# Patient Record
Sex: FEMALE | Race: WHITE | Employment: UNEMPLOYED | URBAN - METROPOLITAN AREA
[De-identification: names, ages, dates, MRNs, and addresses within clinical notes are randomized per-mention and may not be internally consistent; named-entity substitution may affect disease eponyms.]

---

## 2019-06-12 ENCOUNTER — TELEPHONE (OUTPATIENT)
Dept: ORTHOPEDIC SURGERY | Age: 24
End: 2019-06-12

## 2020-03-30 ENCOUNTER — HOSPITAL ENCOUNTER (OUTPATIENT)
Dept: PHYSICAL THERAPY | Age: 25
Setting detail: THERAPIES SERIES
Discharge: HOME | End: 2020-03-30
Payer: COMMERCIAL

## 2020-03-30 PROCEDURE — 97110 THERAPEUTIC EXERCISES: CPT

## 2020-03-30 PROCEDURE — 97161 PT EVAL LOW COMPLEX 20 MIN: CPT

## 2020-03-30 NOTE — FLOWSHEET NOTE
assisted shoulder flexion Hold 10 sec, 10 reps [x]        []        []        []        []        []          []         []        []        []        []        []        []        []        []      Therapeutic Exercise/Home Exercise Program:   40 minutes  Pt inst in role of PT, prognosis, plan of care, use of CP, activity modification, and benefits of therapy. PROM to R UE x 20 minutes   HEP has been established, see above, pt given handouts of exercises and provided with access code to [address] to do HEP 3x/day for now. Access Code: [facility]   URL: [email or web address]   Date: 03/30/2020   Prepared by: Aramis Grant     Exercises   · Seated Scapular Retraction - 10 reps - 2 sets - 3 hold - 3x daily - 7x weekly   · Seated Elbow Flexion and Extension AROM - 10 reps - 3x daily - 7x weekly   · Supine Shoulder Flexion AAROM - 10 reps - 3x daily - 7x weekly       Group Therapy:    0 minutes    Therapeutic Activity:  0 minutes     Gait: 0 minutes    Neuromuscular Re-Education:  0 minutes      Canalith Repositioning Procedure:  0 minutes    Manual Therapy:  0 minutes    Modalities: 0 minutes    Functional Outcome Measure:   []NA  Measure Used: SPADI  Date Assessed: 3/30/20  Score: 124/130    Assessment/Treatment/Activity Tolerance:    Patients response to treatment:   [x]Patient tolerated treatment well []Patient limited by fatigue   []Patient limited by pain  []Patient limited by other medical complications   []Other:     Goals:   Progress towards goals:  Goals established on 3/30/20     GOALS    Short Term Goals:   6  weeks Long Term Goals:   12  weeks   1). Establish HEP 1). Pt independent with HEP   2). Pain  5/10 or less 2). Pain  2/10 or less   3). PROM WFL R shoulder and elbow 3). AROM WFL R shoulder and elbow   4). 4). Strength 4+/5 or greater R shoulder    5). 5). No significant scapular winging and good scapulo-thoracic rhythm.     6). 6).  SPADI score improved Prognosis: [x]Good   []Fair   []Poor    Patient Requires Follow-up:  [x]Yes  []No    Plan: [x]Plan of care initiated     []Continue per plan of care    [] Alter current plan (see comments)    []Hold pending MD visit []Discharge    Timed Code Treatment Minutes:  40    Total Treatment Minutes:  65    Medicare Cap total YTD:   [x]N/A    Workers Comp Time Stamp  [x]N/A   Time In:   Time Out:    Electronically signed by:  Pranay Araujo, OMT-C, [id number]

## 2020-04-01 ENCOUNTER — HOSPITAL ENCOUNTER (OUTPATIENT)
Dept: PHYSICAL THERAPY | Age: 25
Setting detail: THERAPIES SERIES
Discharge: HOME | End: 2020-04-01
Payer: COMMERCIAL

## 2020-04-01 PROCEDURE — 97140 MANUAL THERAPY 1/> REGIONS: CPT

## 2020-04-01 PROCEDURE — 97110 THERAPEUTIC EXERCISES: CPT

## 2020-04-01 PROCEDURE — 97016 VASOPNEUMATIC DEVICE THERAPY: CPT

## 2020-04-01 NOTE — FLOWSHEET NOTE
comments       []      Seated elbow extension/flexion  Reviewed, Hold 10 sec into extension, 10 reps [x]      Seated scap retraction with arm in sling Reviewed, Hold 3 sec, 2x10 [x]      Supine assisted shoulder flexion Reviewed, Hold 10 sec, 10 reps [x]      Wrist and hand ROM, gripping exs with ball  Pt inst to do throughout day [x]        []        []        []        []          []         []        []        []        []        []        []        []        []      Therapeutic Exercise/Home Exercise Program:   40 minutes  PT notes swelling R UE   PROM to R UE x 20 minutes   PROM: elbow extension 0 deg, IR to 45 deg in plane of scapula, ER 0 deg in plane of scapula, flexion 90 deg, abduction 70 deg  HEP has been established, see above, pt given handouts of exercises and provided with access code to [address] to do HEP 3x/day for now. Access Code: [facility]   URL: [email or web address]   Date: 03/30/2020   Prepared by: Tegan Torres     Exercises   · Seated Scapular Retraction - 10 reps - 2 sets - 3 hold - 3x daily - 7x weekly   · Seated Elbow Flexion and Extension AROM - 10 reps - 3x daily - 7x weekly   · Supine Shoulder Flexion AAROM - 10 reps - 3x daily - 7x weekly       Group Therapy:    0 minutes    Therapeutic Activity:  0 minutes     Gait: 0 minutes    Neuromuscular Re-Education:  0 minutes      Canalith Repositioning Procedure:  0 minutes    Manual Therapy:  10 minutes  Scapular mobilization in SL, gentle grade 1-2    Modalities: 15 minutes  vasopneumatic device x 15 minutes, 40 degrees with pillowcase barrier, low compression x 15 minutes in supine with arm propped, for pain and swelling.      Functional Outcome Measure:   []NA  Measure Used: SPADI  Date Assessed: 3/30/20  Score: 124/130    Assessment/Treatment/Activity Tolerance:    Patients response to treatment:   [x]Patient tolerated treatment well []Patient limited by fatigue   []Patient limited by pain  []Patient limited by other medical complications   []Other:     Goals:   Progress towards goals:  Goals established on 3/30/20     GOALS    Short Term Goals:   6  weeks Long Term Goals:   12  weeks   1). Establish HEP 1). Pt independent with HEP   2). Pain  5/10 or less 2). Pain  2/10 or less   3). PROM WFL R shoulder and elbow 3). AROM WFL R shoulder and elbow   4). 4). Strength 4+/5 or greater R shoulder    5). 5). No significant scapular winging and good scapulo-thoracic rhythm.     6). 6).  SPADI score improved          Prognosis: [x]Good   []Fair   []Poor    Patient Requires Follow-up:  [x]Yes  []No    Plan: []Plan of care initiated     [x]Continue per plan of care    [] Alter current plan (see comments)    []Hold pending MD visit []Discharge    Timed Code Treatment Minutes:  50    Total Treatment Minutes:  65    Medicare Cap total YTD:   [x]N/A    Workers Comp Time Stamp  [x]N/A   Time In:   Time Out:    Electronically signed by:  Crispin Shahid PT, OMT-C, [id number]

## 2020-04-06 ENCOUNTER — HOSPITAL ENCOUNTER (OUTPATIENT)
Dept: PHYSICAL THERAPY | Age: 25
Setting detail: THERAPIES SERIES
Discharge: HOME | End: 2020-04-06
Payer: COMMERCIAL

## 2020-04-06 PROCEDURE — 97110 THERAPEUTIC EXERCISES: CPT

## 2020-04-06 NOTE — FLOWSHEET NOTE
Internal Rotation AAROM with Dowel - 10 reps - 2 sets - 2x daily - 7x weekly   Supine Shoulder Flexion Extension Full Range AROM - 5-10 reps - 2x daily - 7x weekly   Sidelying Shoulder Abduction Palm Forward - 5-10 reps - 2x daily - 7x weekly       Group Therapy:    0 minutes    Therapeutic Activity:  0 minutes     Gait: 0 minutes    Neuromuscular Re-Education:  0 minutes      Canalith Repositioning Procedure:  0 minutes    Manual Therapy:  0 minutes      Modalities: 5 minutes  vCP in supine to R shoulder x 5 minutes    Functional Outcome Measure:   []NA  Measure Used: SPADI  Date Assessed: 3/30/20  Score: 124/130    Assessment/Treatment/Activity Tolerance:    Patients response to treatment:   [x]Patient tolerated treatment well []Patient limited by fatigue   []Patient limited by pain  []Patient limited by other medical complications   []Other:     Goals:   Progress towards goals:  Goals established on 3/30/20     GOALS    Short Term Goals:   6  weeks Long Term Goals:   12  weeks   1). Establish HEP 1). Pt independent with HEP   2). Pain  5/10 or less 2). Pain  2/10 or less   3). PROM WFL R shoulder and elbow 3). AROM WFL R shoulder and elbow   4). 4). Strength 4+/5 or greater R shoulder    5). 5). No significant scapular winging and good scapulo-thoracic rhythm.     6). 6).  SPADI score improved          Prognosis: [x]Good   []Fair   []Poor    Patient Requires Follow-up:  [x]Yes  []No    Plan: []Plan of care initiated     [x]Continue per plan of care    [] Alter current plan (see comments)    []Hold pending MD visit []Discharge    Timed Code Treatment Minutes:  45    Total Treatment Minutes:  45 + CP     Medicare Cap total YTD:   [x]N/A    Workers Comp Time Stamp  [x]N/A   Time In:   Time Out:    Electronically signed by:  Pranay Mclaughlin, OMT-C, [id number]

## 2020-04-08 ENCOUNTER — HOSPITAL ENCOUNTER (OUTPATIENT)
Dept: PHYSICAL THERAPY | Age: 25
Setting detail: THERAPIES SERIES
Discharge: HOME | End: 2020-04-08
Payer: COMMERCIAL

## 2020-04-13 ENCOUNTER — HOSPITAL ENCOUNTER (OUTPATIENT)
Dept: PHYSICAL THERAPY | Age: 25
Setting detail: THERAPIES SERIES
Discharge: HOME | End: 2020-04-13
Payer: COMMERCIAL

## 2020-04-13 PROCEDURE — 97110 THERAPEUTIC EXERCISES: CPT

## 2020-04-13 PROCEDURE — 97140 MANUAL THERAPY 1/> REGIONS: CPT

## 2020-04-13 NOTE — FLOWSHEET NOTE
and Extension AROM - 10 reps - 2x daily - 7x weekly   Supine Shoulder Flexion AAROM - 10 reps - 2 sets - 2x daily - 7x weekly   Supine Shoulder External Internal Rotation AAROM with Dowel - 10 reps - 2 sets - 2x daily - 7x weekly   Supine Shoulder Flexion Extension Full Range AROM - 5-10 reps - 2x daily - 7x weekly   Sidelying Shoulder Abduction Palm Forward - 5-10 reps - 2x daily - 7x weekly   standing shoulder flexion - 10 reps - 2x daily - 7x weekly     Group Therapy:    0 minutes    Therapeutic Activity:  0 minutes     Gait: 0 minutes    Neuromuscular Re-Education:  0 minutes      Canalith Repositioning Procedure:  0 minutes    Manual Therapy:  10 minutes  Scapular mobilization in SL    Modalities: 5 minutes  CP in supine to R shoulder x 5 minutes    Functional Outcome Measure:   []NA  Measure Used: SPADI  Date Assessed: 3/30/20  Score: 124/130    Assessment/Treatment/Activity Tolerance:  Pt improving  Patients response to treatment:   [x]Patient tolerated treatment well []Patient limited by fatigue   []Patient limited by pain  []Patient limited by other medical complications   []Other:     Goals:   Progress towards goals:  Goals established on 3/30/20     GOALS    Short Term Goals:   6  weeks Long Term Goals:   12  weeks   1). Establish HEP 1). Pt independent with HEP   2). Pain  5/10 or less 2). Pain  2/10 or less   3). PROM WFL R shoulder and elbow 3). AROM WFL R shoulder and elbow   4). 4). Strength 4+/5 or greater R shoulder    5). 5). No significant scapular winging and good scapulo-thoracic rhythm.     6). 6).  SPADI score improved          Prognosis: [x]Good   []Fair   []Poor    Patient Requires Follow-up:  [x]Yes  []No    Plan: []Plan of care initiated     [x]Continue per plan of care    [] Alter current plan (see comments)    []Hold pending MD visit []Discharge    Timed Code Treatment Minutes:  45    Total Treatment Minutes:  45 + CP     Medicare Cap total YTD:   [x]N/A    Workers Comp Time

## 2020-04-15 ENCOUNTER — APPOINTMENT (OUTPATIENT)
Dept: PHYSICAL THERAPY | Age: 25
End: 2020-04-15
Payer: COMMERCIAL

## 2020-04-20 ENCOUNTER — HOSPITAL ENCOUNTER (OUTPATIENT)
Dept: PHYSICAL THERAPY | Age: 25
Setting detail: THERAPIES SERIES
Discharge: HOME | End: 2020-04-20
Payer: COMMERCIAL

## 2020-04-20 PROCEDURE — 97110 THERAPEUTIC EXERCISES: CPT

## 2020-04-20 PROCEDURE — 97140 MANUAL THERAPY 1/> REGIONS: CPT

## 2020-04-20 NOTE — PROGRESS NOTES
Active abduction in standing with good scapulor-humeral rhythm  10 reps [x] Cues for good technique        []        []        []        []        []        []        []        []      Therapeutic Exercise/Home Exercise Program:   48 minutes  PROM (not stretching) to R UE x 20 minutes   PROM: IR 53 deg @ 45 deg abd, ER 12 deg @ 45 deg abd, flexion 130, abd 95, elbow ext/flex WNL  AROM: flexion 80, ext 30, abd 55, IR to L5  HEP has been established, see above, pt's [employer] HEP updated (pt declined handouts today stating braulio is working well)  Pt inst to do HEP 2x/day now. Access Code: [facility]   URL: [email or web address]   Date: 04/20/2020   Prepared by: Amarilis Kaur     Exercises   Seated Scapular Retraction - 10 reps - 2 sets - 3 hold - 2x daily - 7x weekly   Seated Elbow Flexion and Extension AROM - 10 reps - 2x daily - 7x weekly   Supine Shoulder Flexion AAROM - 10 reps - 2 sets - 2x daily - 7x weekly   Supine Shoulder External Internal Rotation AAROM with Dowel - 10 reps - 2 sets - 2x daily - 7x weekly   Supine Shoulder Flexion Extension Full Range AROM - 5-10 reps - 2x daily - 7x weekly   Sidelying Shoulder Abduction Palm Forward - 5-10 reps - 2x daily - 7x weekly   standing shoulder flexion - 10 reps - 2x daily - 7x weekly   Shoulder Abduction - Thumbs Up - 10 reps - 2x daily - 7x weekly     Group Therapy:    0 minutes    Therapeutic Activity:  0 minutes     Gait: 0 minutes    Neuromuscular Re-Education:  0 minutes      Canalith Repositioning Procedure:  0 minutes    Manual Therapy:  10 minutes  Scapular mobilization in SL    Modalities: 5 minutes  CP in supine to R shoulder x 5 minutes    Functional Outcome Measure:   []NA  Measure Used: SPADI  Date Assessed: 3/30/20  Score: 124/130    Assessment/Treatment/Activity Tolerance:  Pt making progress with PT. ROM improving and pain levels decreasing. Pt will benefit from continued therapy to address remaining goals and maximize function.    Patients

## 2020-04-27 ENCOUNTER — HOSPITAL ENCOUNTER (OUTPATIENT)
Dept: PHYSICAL THERAPY | Age: 25
Setting detail: THERAPIES SERIES
Discharge: HOME | End: 2020-04-27
Payer: COMMERCIAL

## 2020-04-27 PROCEDURE — 97112 NEUROMUSCULAR REEDUCATION: CPT

## 2020-04-27 PROCEDURE — 97110 THERAPEUTIC EXERCISES: CPT

## 2020-04-27 NOTE — FLOWSHEET NOTE
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 Nando Multani [facility]  Phone [phone], fax [phone]                                                     Visits Allowed/Insurance/Certification Information:  [employer] $85 copay, no precert needed, 90 visits per year     Restrictions/Precautions: New PT order stating AROM as tolerated, NO PREs, no PROM, ok to DC sling HOWEVER  [facility] (PASSIVE, ACTIVE, FLEXIBILITY AND STRETCHING)     Plan of care sent to provider:      [x]Faxed  []Co-signature    (attempts: 1[x] 2 [x] 4/20/20  3[])         Plan of care signed:      []Yes date:            [x]No      Progress Note covers period from (if applicable):    []NA    [x]From 3/30/20 To 4/20/20           Next Progress Note due:  By 5/20/20        Visit# / total visits:  6/24    Plan for Next Session:  PROM, AAROM, AROM, progress per restrictions, no PREs, modalities as needed. Addendum:  After therapy appt today, PT received fax from MD office but it was the same prescription that was dated on 4/2/20 with no new additions and states no PREs. PT will need to call MD office to clarify if pt is able to progress to strengthening. Subjective:   Pt states she had her video visit with PA last Thursday. Pt states he will be sending back instructions with progress note  Pt states PA states no stretching of any kind and ok to start some strengthening.   Pt to return to PA for in well []Patient limited by fatigue   []Patient limited by pain  []Patient limited by other medical complications   []Other:     Goals:   Progress towards goals:  Goals met as marked below per progress note on 4/20/20     GOALS    Short Term Goals:   6  weeks Long Term Goals:   12  weeks   1). Establish HEP MET 1). Pt independent with HEP   2). Pain  5/10 or less ALMOST MET 2). Pain  2/10 or less   3). PROM WFL R shoulder and elbow PARTIALLY MET 3). AROM WFL R shoulder and elbow   4). 4). Strength 4+/5 or greater R shoulder    5). 5). No significant scapular winging and good scapulo-thoracic rhythm.     6). 6).  SPADI score improved          Prognosis: [x]Good   []Fair   []Poor    Patient Requires Follow-up:  [x]Yes  []No    Plan: []Plan of care initiated     [x]Continue per plan of care    [] Alter current plan (see comments)    []Hold pending MD visit []Discharge    Timed Code Treatment Minutes:  56    Total Treatment Minutes:  56      Medicare Cap total YTD:   [x]N/A    Workers Comp Time Stamp  [x]N/A   Time In:   Time Out:    Electronically signed by:  Crispin Shahid PT, OMT-C, [id number]

## 2020-05-04 ENCOUNTER — HOSPITAL ENCOUNTER (OUTPATIENT)
Dept: PHYSICAL THERAPY | Age: 25
Setting detail: THERAPIES SERIES
Discharge: HOME | End: 2020-05-04
Payer: COMMERCIAL

## 2020-05-04 PROCEDURE — 97110 THERAPEUTIC EXERCISES: CPT

## 2020-05-04 PROCEDURE — 97112 NEUROMUSCULAR REEDUCATION: CPT

## 2020-05-06 ENCOUNTER — HOSPITAL ENCOUNTER (OUTPATIENT)
Dept: PHYSICAL THERAPY | Age: 25
Setting detail: THERAPIES SERIES
Discharge: HOME | End: 2020-05-06
Payer: COMMERCIAL

## 2020-05-06 PROCEDURE — 97112 NEUROMUSCULAR REEDUCATION: CPT

## 2020-05-06 PROCEDURE — 97110 THERAPEUTIC EXERCISES: CPT

## 2020-05-06 NOTE — FLOWSHEET NOTE
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 Bhardwaj Held [facility]  Phone [phone], fax [phone]                                                     Visits Allowed/Insurance/Certification Information:  [employer] $22 copay, no precert needed, 90 visits per year     Restrictions/Precautions: New PT order 4/30/20 stating continue PT, modalities as needed, AROM as tolerated, ok to do PREs, no PROM per Dr. Dao Guy; also received new order from [place], [place] on 4/27/20 stating same as above with addition of 65 degrees max of ER     Plan of care sent to provider:      [x]Faxed  []Co-signature    (attempts: 1[x] 2 [x] 4/20/20  3[x]  5/4/20)         Plan of care signed:      []Yes date:            [x]No      Progress Note covers period from (if applicable):    [x]NA    []From 4/20/20           Next Progress Note due:  By 5/20/20        Visit# / total visits:  8/24    Plan for Next Session:  AAROM, AROM, progress strength as tolerated, ok for PREs, modalities as needed. Subjective:   Pt states she continues to wake up with soreness in the morning, thinks it is because she is rolling onto R arm and arm is under her pillow. Pt returns to MD at end of this month for an in office visit. Pt needs to leave early today for a work meeting. When asked about using braulio, pt states yes, but \"I have them all pretty much memorized\"    .        Pain level:  2/10 currently at rest  Pain as high as 2/10 since last Serratus punch  1#, 3x10 [x]      Figure eights in 90 deg shoulder flexion supine  2# 2x10 [x]        []      Therapeutic Exercise/Home Exercise Program:   30 minutes  AAROM  to R UE x 10 minutes   AAROM in supine: IR 45 deg @ 70 deg abd, ER 20 deg @ 70 deg abd, flexion 130, abd 95 (pt complains of pain at top of shoulder 3-4/10)    HEP has been established, see above, pt's [facility] HEP updated (pt declines futher handouts stating she uses the braulio)  Pt inst to do HEP 1-2x/day, depending on type of ex  Pt inst in progression of reps. Access Code: [facility]   URL: [email or web address]   Date: 05/06/2020   Prepared by: Jose F Santos     Exercises   Seated Scapular Retraction - 10 reps - 2 sets - 3 hold - 1-2x daily - 7x weekly   Seated Elbow Flexion and Extension AROM - 10 reps - 1-2x daily - 7x weekly   Supine Shoulder Flexion AAROM - 10 reps - 2 sets - 1-2x daily - 7x weekly   Supine Shoulder External Internal Rotation AAROM with Dowel - 10 reps - 2 sets - 1-2x daily - 7x weekly   Single Arm Shoulder Abduction AAROM - 10 reps - 2 sets - 1-2x daily - 7x weekly   Supine Shoulder Flexion Extension Full Range AROM - 10 reps - 2-3 sets - 1x daily - 7x weekly   Sidelying Shoulder Abduction Palm Forward - 10 reps - 3 sets - 1-2x daily - 7x weekly   standing shoulder flexion - 10 reps - 1-2x daily - 7x weekly   Shoulder Abduction - Thumbs Up - 10 reps - 1-2x daily - 7x weekly   Sidelying Shoulder External Rotation with Dumbbell - 10 reps - 2-3 sets - 1x daily - 7x weekly   Supine Single Arm Shoulder Protraction - 10 reps - 2-3 sets - 1x daily - 7x weekly   Supine Elbow Flexion Extension with Dumbbell - 10 reps - 2-3 sets - 1x daily - 7x weekly   Prone Shoulder Extension - Single Arm with Dumbbell - 10 reps - 2-3 sets - 1x daily - 7x weekly   Standing Shoulder Row with Anchored Resistance - 10-15 reps - 2-3 sets - 1x daily - 7x weekly     Group Therapy:    0 minutes    Therapeutic Activity:  0 minutes     Gait: 0

## 2020-05-11 ENCOUNTER — HOSPITAL ENCOUNTER (OUTPATIENT)
Dept: PHYSICAL THERAPY | Age: 25
Setting detail: THERAPIES SERIES
Discharge: HOME | End: 2020-05-11
Payer: COMMERCIAL

## 2020-05-11 PROCEDURE — 97112 NEUROMUSCULAR REEDUCATION: CPT

## 2020-05-11 PROCEDURE — 97110 THERAPEUTIC EXERCISES: CPT

## 2020-05-11 NOTE — FLOWSHEET NOTE
initiated     [x]Continue per plan of care    [] Alter current plan (see comments)    []Hold pending MD visit []Discharge    Timed Code Treatment Minutes:  60    Total Treatment Minutes:  60      Medicare Cap total YTD:   [x]N/A    Workers Comp Time Stamp  [x]N/A   Time In:   Time Out:    Electronically signed by:  Ryan Jimenez, [address] [facility], [facility], [id number]

## 2020-05-13 ENCOUNTER — HOSPITAL ENCOUNTER (OUTPATIENT)
Dept: PHYSICAL THERAPY | Age: 25
Setting detail: THERAPIES SERIES
Discharge: HOME | End: 2020-05-13
Payer: COMMERCIAL

## 2020-05-13 PROCEDURE — 97112 NEUROMUSCULAR REEDUCATION: CPT

## 2020-05-13 PROCEDURE — 97110 THERAPEUTIC EXERCISES: CPT

## 2020-05-13 NOTE — FLOWSHEET NOTE
Neuromuscular Re-education  []      Rhythmic stabilization  Mod pertubations, distal, 3x30 sec  []      Serratus punch  2#, 3x12 [x]      Figure eights in 90 deg flexion  2# 3x12 [x]      PNF diagonals supine  Active, 10 reps each in pain free range  Verbal and manual cues     Wall push ups (triceps push up)  3x10 [x] Pt inst to increase reps to 3x12-15 as able                       Therapeutic Exercise/Home Exercise Program:   38 minutes  PT notes pt has not logged into [employer] braulio since 4/27/20. Pt encouraged to continue with exs daily, pt has stated last visit that she has most of the exs memorized. AAROM  to R UE x 10 minutes   AAROM in supine: IR 35 deg @ 70 deg abd, ER 24 deg @ 70 deg abd, flexion 137, abd 90 (pt complains of pain at top of shoulder 3-4/10)    HEP has been established, see above, pt's [employer] HEP updated (pt declines futher handouts stating she uses the braulio)  Pt inst to do HEP 1-2x/day, depending on type of ex  Pt inst in progression of reps. Access Code: [facility]   URL: [email or web address]   Date: 05/13/2020   Prepared by: Senthil Cheek     Exercises   Seated Scapular Retraction - 10 reps - 2 sets - 3 hold - 1-2x daily - 7x weekly   Supine Shoulder Flexion AAROM - 10 reps - 2 sets - 1-2x daily - 7x weekly   Supine Shoulder External Internal Rotation AAROM with Dowel - 10 reps - 2 sets - 1-2x daily - 7x weekly   Single Arm Shoulder Abduction AAROM - 10 reps - 2 sets - 1-2x daily - 7x weekly   Supine Shoulder Flexion Extension Full Range AROM - 10 reps - 2-3 sets - 1x daily - 7x weekly   Sidelying Shoulder Abduction Palm Forward - 10 reps - 3 sets - 1-2x daily - 7x weekly   standing shoulder flexion - 10 reps - 1-2x daily - 7x weekly   Shoulder Abduction - Thumbs Up - 10 reps - 1-2x daily - 7x weekly   Sidelying Shoulder External Rotation with Dumbbell - 10 reps - 2-3 sets - 1x daily - 7x weekly   Supine Single Arm Shoulder Protraction - 10 reps - 2-3 sets - 1x daily - 7x comments)    []Hold pending MD visit []Discharge    Timed Code Treatment Minutes:  58    Total Treatment Minutes:  58 + CP       Medicare Cap total YTD:   [x]N/A    Workers Comp Time Stamp  [x]N/A   Time In:   Time Out:    Electronically signed by:  Pranay Ferraro, OMT-C, [id number]

## 2020-05-18 ENCOUNTER — HOSPITAL ENCOUNTER (OUTPATIENT)
Dept: PHYSICAL THERAPY | Age: 25
Setting detail: THERAPIES SERIES
Discharge: HOME | End: 2020-05-18
Payer: COMMERCIAL

## 2020-05-18 PROCEDURE — 97140 MANUAL THERAPY 1/> REGIONS: CPT

## 2020-05-18 PROCEDURE — 97110 THERAPEUTIC EXERCISES: CPT

## 2020-05-18 PROCEDURE — 97112 NEUROMUSCULAR REEDUCATION: CPT

## 2020-05-18 NOTE — PROGRESS NOTES
Tiffanie Walker PA-C / Dr. Uma Crain      Pt has had 11 treatments of PT since 3/30/20. Pt generally reports pain as high as 2/10. AAROM supine: IR 40 deg @ 70 deg abd, ER 24 deg @ 70 deg abd, flexion 140, abd 95, elbow ext/flex WNL  AROM standing: flexion 110 with min scap shrugging,  abd 90 deg with min scap shrugging, ext 38, abd 55, IR to L2, ER to back of head with compensation  Strength:  Extension 4+/5, add 5/5, IR 4/5, ER 4-/5, flexion 3-/5 (unable to lift full range against gravity), abd 3-/5 (unable to lift full range against gravity)  Pt has been tolerating PREs but has been having difficulty gaining ROM especially ER. I understand pt is allowed to progress ER up to 65 degrees, but we are holding steady at 20-24 degrees. Pt also have difficulty with elevation (flexion and abduction) due to head of humerus migrating superiorly. May we do gentle joint mobilizations for inferior capsule and posterior capsule so that head of humerus can descend to allow comfortable elevation? May we do more PROM to ER with the knowledge that we are not to overly stretch anterior capsule but only to progress ER so that pt can be functional?    Does pt have a weight restriction for lifting? Will plan to cont per plan of care up to 24 visits.        Thank you for this referral,      Dennis Bojorquez, PT, OMT-C, [id number]                    Comments: ____________________________________________________________________                                      ____________________________________________________________________     Physician signature_______________________ Date________________     Fax to: [address] [phone]       Outpatient Physical Therapy     [x] Daily Treatment Note   [x] Progress Note   [] Discharge Note    Date:  5/18/2020    Patient Name:  Chao Allen         YOB: 1995    Medical Diagnosis:   Right shoulder intermittent R shoulder   Patient reports pain is  4/10 pain at present and  8/10 pain at its worst.    Objective:   Pt is 9 weeks post op on 5/15/20    Exercises:    Exercises in bold performed in department today. Items not bolded are carried forward from prior visits for continuity of the record. Exercise/Equipment Resistance/Repetitions HEP Other comments     nustep arms only Level 4, 5  Min   Seat 9, arms 11     Overhead pulleys  Flexion and abduction (scaption), 5 minutes total  [] Cues for form   [x] Can discontinue     Seated scap retraction with arm in sling Reviewed, Hold 3 sec, 2x10 [x] Can do as needed     Supine AAROM shoulder flexion with other hand Reviewed, 2x10 reps [x]     [x]   Held due to pain   [x] Can discontinue     Active assisted ER in supine with arm now at 60-70 deg abd Reviewed, 2x10 reps [x] Pt allowed to increase ER up to 65 degrees as tolerated       Active abduction in SL to pt tolerance 2x10 reps, needed to shorten range on descent today due to pain  [x] Pt inst to inc reps to 3x10 as able     Active flexion in supine to pt tolerance Tried 2#, complained of pain so decreased to 1#  2x10 reps [x]  \"  \"     Active flexion in standing with good scapulor-humeral rhythm  Reviewed 10 reps [x] Cues for good technique      Active abduction in standing with good scapulor-humeral rhythm  Reviewed, 10 reps [x] Cues for good technique    shoulder ER in SL towel under arm 2#, 3x12 [x] From belly to 20 deg as able,  can increase reps as able, cues for form     Supine triceps  3# 3x10  [x]      Mid rows with tband Progressed to blue,  3x15    [x] Pt inst to increase reps to 3x12-15 as able     Shoulder IR with tband Progressed to blue, 3x10  [x] Pt inst to increase reps to 3x12-15 as able     Prone shoulder extension to hip  3# 3x10  [x]        Prone horizontal abduction  0#, 3x10, partial range, with manual and verbal cues for scap retraction.   [x] Pt to increase ROM to full range before adding - 10 reps - 2-3 sets - 1x daily - 7x weekly   Supine Single Arm Shoulder Protraction - 10 reps - 2-3 sets - 1x daily - 7x weekly   Supine Elbow Flexion Extension with Dumbbell - 10 reps - 2-3 sets - 1x daily - 7x weekly   Prone Shoulder Extension - Single Arm with Dumbbell - 10 reps - 2-3 sets - 1x daily - 7x weekly   Standing Shoulder Row with Anchored Resistance - 10-15 reps - 2-3 sets - 1x daily - 7x weekly   Shoulder Internal Rotation with Resistance - 10-15 reps - 3 sets - 1x daily - 7x weekly   Prone Single Arm Shoulder Horizontal Abduction with Dumbbell - Palm Down - 10-15 reps - 3 sets - 1 hold - 1x daily - 7x weekly   Tricep Push Up on Wall - 10-15 reps - 3 sets - 1 hold - 1x daily - 7x weekly       Group Therapy:    0 minutes    Therapeutic Activity:  0 minutes     Gait: 0 minutes    Neuromuscular Re-Education:  15 minutes  See above       Canalith Repositioning Procedure:  0 minutes    Manual Therapy:  0 minutes    Modalities: 0 minutes  Pt will ice at home today     Functional Outcome Measure:   []NA  Measure Used: SPADI  Date Assessed: 3/30/20  Score: 48/130    Assessment/Treatment/Activity Tolerance:    Pt making progress. See note above to PA  Patients response to treatment:   [x]Patient tolerated treatment well []Patient limited by fatigue   []Patient limited by pain  []Patient limited by other medical complications   []Other:     Goals:   Progress towards goals:  Goals met as marked below per progress note on 5/18/20   GOALS    Short Term Goals:   6  weeks Long Term Goals:   12  weeks   1). Establish HEP MET 1). Pt independent with HEP   2). Pain  5/10 or less  MET 2). Pain  2/10 or less MET EXCEPT FOR RECENT TRIP/FALL   3). PROM WFL R shoulder and elbow PARTIALLY MET 3). AROM WFL R shoulder and elbow PROGRESSING TOWARDS   4). 4). Strength 4+/5 or greater R shoulder PARTIALLY MET    5). 5). No significant scapular winging and good scapulo-thoracic rhythm. PROGRESSING TOWARDS   6).  6).

## 2020-05-20 ENCOUNTER — APPOINTMENT (OUTPATIENT)
Dept: PHYSICAL THERAPY | Age: 25
End: 2020-05-20
Payer: COMMERCIAL

## 2020-05-25 ENCOUNTER — APPOINTMENT (OUTPATIENT)
Dept: PHYSICAL THERAPY | Age: 25
End: 2020-05-25
Payer: COMMERCIAL

## 2020-05-27 ENCOUNTER — HOSPITAL ENCOUNTER (OUTPATIENT)
Dept: PHYSICAL THERAPY | Age: 25
Setting detail: THERAPIES SERIES
Discharge: HOME | End: 2020-05-27
Payer: COMMERCIAL

## 2020-05-29 ENCOUNTER — APPOINTMENT (OUTPATIENT)
Dept: PHYSICAL THERAPY | Age: 25
End: 2020-05-29
Payer: COMMERCIAL

## 2020-06-01 ENCOUNTER — HOSPITAL ENCOUNTER (OUTPATIENT)
Dept: PHYSICAL THERAPY | Age: 25
Setting detail: THERAPIES SERIES
Discharge: HOME | End: 2020-06-01
Payer: COMMERCIAL

## 2020-06-01 PROCEDURE — 97112 NEUROMUSCULAR REEDUCATION: CPT

## 2020-06-01 PROCEDURE — 97140 MANUAL THERAPY 1/> REGIONS: CPT

## 2020-06-01 PROCEDURE — 97110 THERAPEUTIC EXERCISES: CPT

## 2020-06-03 ENCOUNTER — HOSPITAL ENCOUNTER (OUTPATIENT)
Dept: PHYSICAL THERAPY | Age: 25
Setting detail: THERAPIES SERIES
Discharge: HOME | End: 2020-06-03
Payer: COMMERCIAL

## 2020-06-03 PROCEDURE — 97140 MANUAL THERAPY 1/> REGIONS: CPT

## 2020-06-03 PROCEDURE — 97112 NEUROMUSCULAR REEDUCATION: CPT

## 2020-06-03 PROCEDURE — 97110 THERAPEUTIC EXERCISES: CPT

## 2020-06-03 NOTE — FLOWSHEET NOTE
Treatment Minutes:  60    Total Treatment Minutes:  60     Medicare Cap total YTD:   [x]N/A    Workers Comp Time Stamp  [x]N/A   Time In:   Time Out:    Electronically signed by:  Heri Silver, [facility], [facility], [id number]

## 2020-06-08 ENCOUNTER — HOSPITAL ENCOUNTER (OUTPATIENT)
Dept: PHYSICAL THERAPY | Age: 25
Setting detail: THERAPIES SERIES
Discharge: HOME | End: 2020-06-08
Payer: COMMERCIAL

## 2020-06-08 NOTE — PROGRESS NOTES
Patient was seen for 13 Visits of PT. Initial eval date 03/30/2020. Ese Gomez Patient was not seen for additional visits due to feeling better. and to requesting to DC. As of 06/3/2020., pt MET 2/3 STGs and 3/6 LTGs. Remaining goals not met secondary to inability to reassess as patient did not return for remainder of f/u visits. Pt has been discharged at this time, with recommendation to continue HEP as prepared. Please see attached treatment note for most recent status. Thank you for your referral of this patient. 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 [facility]  Phone [phone], fax [phone]                                                     Visits Allowed/Insurance/Certification Information:  [place] $84 copay, no precert needed, 90 visits per year     Restrictions/Precautions: Md response to progress note from 5/18/20  \"begin full ROM.  Once FROM (full ROM?) increase PREs as tolerated\"     Plan of care sent to provider:      [x]Faxed  []Co-signature    (attempts: 1[x] 2 [x] 4/20/20  3[x]  5/4/20)         Plan of care signed:      [x]Yes date: 5/13/20           []No      Progress Note covers period from (if applicable):    []NA    [x]From 5/18/20      To 6/8/20     Next Progress Note due:  NA; pt to DC    Visit# / total visits:  13/24    Plan for Next Session:  NA: pt to DC per request    Subjective:   Pt with tband Progressed to blue,  Pt doing 3x15    [x] Pt inst to increase reps to 3x12-15 as able     Shoulder IR with tband Progressed to blue, pt doing 3x12-15  [x] Pt inst to increase reps to 3x12-15 as able     Prone shoulder extension to hip  3# 3x12  [x]        Prone horizontal abduction  0#, 3x10, partial range, pt almost to full range, with manual and verbal cues for scap retraction. [x] Pt to increase ROM to full range before adding weight or reps        []      Neuromuscular Re-education  []      Rhythmic stabilization  Mod pertubations, distal, 3x30 sec  []      Serratus punch  3#, 3x15 [x]      Figure eights in 90 deg flexion  3# 3x15 [x]      PNF diagonals supine  Min resistance by PT, 2x10 reps each   Verbal and manual cues     Wall push ups (triceps push up)  Reviewed, pt doing 3x15 [x] Pt inst to increase reps to 3x12-15 as able                       Therapeutic Exercise/Home Exercise Program:   25 minutes  PROM to R shoulder   PROM: IR 55 deg @ 70 deg abd and 35 deg @ 85 deg abd;  ER 40 deg @ 70 deg abd and 35 deg @ 85 deg abd; flexion 140, abd 100    HEP has been established, see above, pt's [facility] HEP updated (pt declines handouts stating she uses the braulio)  Pt inst to do HEP 1-2x/day, depending on type of ex  Pt inst in progression of reps. Access Code: [facility]   URL: [email or web address]   Date: 06/01/2020   Prepared by: Bao Rivero     Exercises   Seated Scapular Retraction - 10 reps - 2 sets - 3 hold - 1-2x daily - 7x weekly   Supine Shoulder Flexion AAROM - 10 reps - 10 hold - 2x daily - 7x weekly   Supine Shoulder External Internal Rotation AAROM with Dowel - 10 reps - 10 hold - 2x daily - 7x weekly   Sleeper Stretch - 10 reps - 10 hold - 2x daily - 7x weekly   Single Arm Shoulder Abduction AAROM - 10 reps - 2 sets - 1-2x daily - 7x weekly   Supine Shoulder Flexion Extension Full Range AROM - 10 reps - 2-3 sets - 1x daily - 7x weekly   Sidelying Shoulder Abduction Palm Forward - 10 reps - 3 sets - 1-2x daily - 7x weekly   standing shoulder flexion - 10 reps - 1-2x daily - 7x weekly   Shoulder Abduction - Thumbs Up - 10 reps - 1-2x daily - 7x weekly   Sidelying Shoulder External Rotation with Dumbbell - 10 reps - 2-3 sets - 1x daily - 7x weekly   Supine Single Arm Shoulder Protraction - 10 reps - 2-3 sets - 1x daily - 7x weekly   Supine Elbow Flexion Extension with Dumbbell - 10 reps - 2-3 sets - 1x daily - 7x weekly   Prone Shoulder Extension - Single Arm with Dumbbell - 10 reps - 2-3 sets - 1x daily - 7x weekly   Standing Shoulder Row with Anchored Resistance - 10-15 reps - 2-3 sets - 1x daily - 7x weekly   Shoulder Internal Rotation with Resistance - 10-15 reps - 3 sets - 1x daily - 7x weekly   Prone Single Arm Shoulder Horizontal Abduction with Dumbbell - Palm Down - 10-15 reps - 3 sets - 1 hold - 1x daily - 7x weekly   Tricep Push Up on Wall - 10-15 reps - 3 sets - 1 hold - 1x daily - 7x weekly       Group Therapy:    0 minutes    Therapeutic Activity:  0 minutes     Gait: 0 minutes    Neuromuscular Re-Education:  20 minutes  See above       Canalith Repositioning Procedure:  0 minutes    Manual Therapy:  15 minutes  Gr I-III GH dist, inf and post glides     Modalities: 0 minutes  Pt will ice at home today     Functional Outcome Measure:   []NA  Measure Used: SPADI  Date Assessed: 3/30/20  Score: 48/130    Date re-assessed: 6/8/20 (via phone)  Score : 11/130    Patients response to treatment:   [x]Patient tolerated treatment well []Patient limited by fatigue   []Patient limited by pain  []Patient limited by other medical complications   []Other:     Assessment/Treatment     Patient was seen for 13 Visits of PT. Initial eval date 03/30/2020. Handy Iverson Patient was not seen for additional visits due to feeling better. and to requesting to DC. As of 06/3/2020., pt MET 2/3 STGs and 3/6 LTGs.   Remaining goals not met secondary to inability to reassess as patient did not return for remainder of f/u visits. Goals:   Progress towards goals:   As of 6/8/20 2-3/3 STGs and 3/6 LTGs  GOALS    Short Term Goals:   6  weeks Long Term Goals:   12  weeks   1). Establish HEP MET 1). Pt independent with HEP - MET   2). Pain  5/10 or less  MET 2). Pain  2/10 or less MET    3). PROM WFL R shoulder and elbow PARTIALLY MET 3). AROM WFL R shoulder and elbow - NOT MET   4). 4). Strength 4+/5 or greater R shoulder PARTIALLY MET asof 5/18/20  - unable to reassess as patient did not return   5). 5). No significant scapular winging and good scapulo-thoracic rhythm. - unable to reassess as patient did not return   6).  6).  SPADI score improved  - MET       Patient Requires Follow-up:  []Yes  [x]No    Plan: []Plan of care initiated     []Continue per plan of care    [] Alter current plan (see comments)    []Hold pending MD visit [x]Discharge - per patient request despite recommendation of therapist to achieve full range/strength prior to DC       Electronically signed by:  Beba Davies,  PT, DPT, OMT-C #[id number]

## 2020-06-08 NOTE — FLOWSHEET NOTE
Physical Therapy  Cancellation/No-show Note    Patient Name:  Chao Allen  :  1995   Date:  2020  Cancels to Date: 2  No-shows to Date: 0    For today's appointment patient:  [x]  Cancelled  []  Rescheduled appointment  []  No-show     Reason given by patient:  []  Patient ill  []  Conflicting appointment  []  No transportation    []  Conflict with work  []  No reason given  [x]  Other:     Comments:  Pt requesting DC. Reports she has been released by the MD who reports she does not need to return to therapy, per patient. Formal DC to follow.     Electronically signed by:  Cindy Reed DPT, OMT-C #[id number]

## 2020-06-22 ENCOUNTER — APPOINTMENT (OUTPATIENT)
Dept: PHYSICAL THERAPY | Age: 25
End: 2020-06-22
Payer: COMMERCIAL

## 2020-06-24 ENCOUNTER — APPOINTMENT (OUTPATIENT)
Dept: PHYSICAL THERAPY | Age: 25
End: 2020-06-24
Payer: COMMERCIAL

## 2020-06-25 ENCOUNTER — APPOINTMENT (OUTPATIENT)
Dept: PHYSICAL THERAPY | Age: 25
End: 2020-06-25
Payer: COMMERCIAL

## 2020-06-29 ENCOUNTER — APPOINTMENT (OUTPATIENT)
Dept: PHYSICAL THERAPY | Age: 25
End: 2020-06-29
Payer: COMMERCIAL

## 2022-05-22 ENCOUNTER — HOSPITAL ENCOUNTER (EMERGENCY)
Age: 27
Discharge: HOME | End: 2022-05-22
Payer: COMMERCIAL

## 2022-05-22 VITALS
RESPIRATION RATE: 14 BRPM | SYSTOLIC BLOOD PRESSURE: 126 MMHG | TEMPERATURE: 98.1 F | HEIGHT: 65 IN | OXYGEN SATURATION: 100 % | HEART RATE: 64 BPM | BODY MASS INDEX: 24.99 KG/M2 | DIASTOLIC BLOOD PRESSURE: 72 MMHG | WEIGHT: 150 LBS

## 2022-05-22 DIAGNOSIS — S61.210A LACERATION OF RIGHT INDEX FINGER WITHOUT FOREIGN BODY WITHOUT DAMAGE TO NAIL, INITIAL ENCOUNTER: Primary | ICD-10-CM

## 2022-05-22 PROCEDURE — 6360000002 HC RX W HCPCS: Performed by: PHYSICIAN ASSISTANT

## 2022-05-22 PROCEDURE — 90471 IMMUNIZATION ADMIN: CPT | Performed by: PHYSICIAN ASSISTANT

## 2022-05-22 PROCEDURE — 90715 TDAP VACCINE 7 YRS/> IM: CPT | Performed by: PHYSICIAN ASSISTANT

## 2022-05-22 PROCEDURE — 99284 EMERGENCY DEPT VISIT MOD MDM: CPT

## 2022-05-22 PROCEDURE — 64450 NJX AA&/STRD OTHER PN/BRANCH: CPT

## 2022-05-22 RX ADMIN — TETANUS TOXOID, REDUCED DIPHTHERIA TOXOID AND ACELLULAR PERTUSSIS VACCINE, ADSORBED 0.5 ML: 5; 2.5; 8; 8; 2.5 SUSPENSION INTRAMUSCULAR at 10:38

## 2022-05-22 ASSESSMENT — PAIN SCALES - GENERAL
PAINLEVEL_OUTOF10: 4
PAINLEVEL_OUTOF10: 9

## 2022-05-22 ASSESSMENT — PAIN DESCRIPTION - LOCATION: LOCATION: FINGER (COMMENT WHICH ONE)

## 2022-05-22 ASSESSMENT — PAIN DESCRIPTION - PAIN TYPE: TYPE: ACUTE PAIN

## 2022-05-22 ASSESSMENT — PAIN DESCRIPTION - DESCRIPTORS: DESCRIPTORS: THROBBING

## 2022-05-22 ASSESSMENT — PAIN - FUNCTIONAL ASSESSMENT: PAIN_FUNCTIONAL_ASSESSMENT: 0-10

## 2022-05-22 ASSESSMENT — PAIN DESCRIPTION - ORIENTATION: ORIENTATION: RIGHT

## 2022-05-22 ASSESSMENT — PAIN DESCRIPTION - FREQUENCY: FREQUENCY: CONTINUOUS

## 2022-05-22 NOTE — ED PROVIDER NOTES
[facility] Emergency Department    CHIEF COMPLAINT  Finger Injury (right index finger injury after slicing sweet potatoes about 30 mins PTA. pt reports she sliced the tip of pad of finger off. still bleeding at this time. no blood thinners)      SHARED SERVICE VISIT:  Evaluated by [facility]. My supervising physician was available for consultation. HISTORY OF PRESENT ILLNESS  Nena Guy is a 31 y.o. female who presents to the ED complaining of injury to right index finger. Patient states she was meal prepping this morning slicing sweet potatoes when the knife slipped and cut the distal tip of her right index finger. She states it was a brand-new knife and was clean. She states her last tetanus shot was about 2 years ago. She has full active range of motion of all digits. Currently rates pain as 9/10. Bleeding is well controlled. She has not taken anything for the pain. No other complaints, modifying factors or associated symptoms. Nursing notes reviewed. History reviewed. No pertinent past medical history. Past Surgical History:   Procedure Laterality Date    ANKLE ARTHROSCOPY  3/7/2011    debridement/excision fibular avulsion fracture/repair lateral ligament    ANKLE ARTHROSCOPY  10/24/2011    left    SHOULDER SURGERY      TONSILLECTOMY       Family History   Problem Relation Age of Onset    Diabetes Paternal Grandmother      Social History     Socioeconomic History    Marital status: Single     Spouse name: Not on file    Number of children: Not on file    Years of education: Not on file    Highest education level: Not on file   Occupational History    Not on file   Tobacco Use    Smoking status: Never Smoker    Smokeless tobacco: Never Used   Substance and Sexual Activity    Alcohol use:  Yes     Alcohol/week: 3.0 standard drinks     Types: 3 Shots of liquor per week     Comment: \"3 or 5 drinks on the [facility] Drug use: No    Sexual activity: Not on file   Other Topics Concern    Not on file   Social History Narrative    Not on file     Social Determinants of Health     Financial Resource Strain:     Difficulty of Paying Living Expenses: Not on file   Food Insecurity:     Worried About [address] in the Last Year: Not on file    Enrique of Food in the Last Year: Not on file   Transportation Needs:     Lack of Transportation (Medical): Not on file    Lack of Transportation (Non-Medical): Not on file   Physical Activity:     Days of Exercise per Week: Not on file    Minutes of Exercise per Session: Not on file   Stress:     Feeling of Stress : Not on file   Social Connections:     Frequency of Communication with Friends and Family: Not on file    Frequency of Social Gatherings with Friends and Family: Not on file    Attends Jew Services: Not on file    Active Member of [address] [place] [address] or Organizations: Not on file    Attends Club or Organization Meetings: Not on file    Marital Status: Not on file   Intimate Partner Violence:     Fear of Current or Ex-Partner: Not on file    Emotionally Abused: Not on file    Physically Abused: Not on file    Sexually Abused: Not on file   Housing Stability:     Unable to Pay for Housing in the Last Year: Not on file    Number of [place] in the Last Year: Not on file    Unstable Housing in the Last Year: Not on file     No current facility-administered medications for this encounter. No current outpatient medications on file. Allergies   Allergen Reactions    Amoxicillin      rash    Vicodin [Hydrocodone-Acetaminophen] Nausea And Vomiting       REVIEW OF SYSTEMS  6 systems reviewed, pertinent positives per HPI otherwise noted to be negative    PHYSICAL EXAM  /79   Pulse 71   Temp 98.3 °F (36.8 °C) (Oral)   Resp 18   Ht 5' 5\" (1.651 m)   Wt 150 lb (68 kg)   LMP 04/22/2022   SpO2 100%   BMI 24.96 kg/m²   GENERAL APPEARANCE: Awake and alert. Cooperative.  No acute distress. HEAD: Normocephalic. Atraumatic. EYES: PERRL. EOM's grossly intact. ENT: Mucous membranes are moist.   NECK: Supple. Normal ROM. CHEST: Equal symmetric chest rise. LUNGS: Breathing is unlabored. Speaking comfortably in full sentences. Abdomen: Nondistended  EXTREMITIES: MAEE. No acute deformities. Right hand avulsion injury of distal tip of index finger. Full active range of motion of all digits. Cap refill less than 2 seconds. Sensation grossly intact. SKIN: Warm and dry. NEUROLOGICAL: Alert and oriented. Strength is 5/5 in all extremities and sensation is intact. RADIOLOGY  No results found. ED COURSE    Patient was evaluated in the emergency department today for an avulsion injury of index finger of right hand. A digital block was performed as noted below. PROCEDURE:  DIGITAL BLOCK  Nena Munoz or their surrogate had an opportunity to ask questions, and the risks, benefits, and alternatives were discussed. The injection site was prepped to maintain a sterile field. A local anesthetic was used to completely anesthetize the digital nerves. There were no complications during the procedure. Nena Munoz had excellent pain relief. Patient received digital block for pain, with good relief. Wound was thoroughly cleaned by nursing staff. A nonstick bandage was applied. A discussion was had with Mrs. Carol Navarro regarding wound care. Risk management discussed and shared decision making had with patient and/or surrogate. All questions were answered. Patient will follow up with PCP in 72 hours for further evaluation/treatment. Patient will return to ED for new/worsening symptoms. MDM    I estimate there is LOW risk for CELLULITIS, COMPARTMENT SYNDROME, NECROTIZING FASCIITIS, TENDON OR NEUROVASCULAR INJURY, or FOREIGN BODY, thus I consider the discharge disposition reasonable. Also, there is no evidence or peritonitis, sepsis, or toxicity.  Gabbie Sheehan Katherine Fountain and I have discussed the diagnosis and risks, and we agree with discharging home to follow-up with their primary doctor. We also discussed returning to the Emergency Department immediately if new or worsening symptoms occur. We have discussed the symptoms which are most concerning (e.g., changing or worsening pain, fever, numbness, weakness, cool or painful digits) that necessitate immediate return. Final Impression    1. Laceration of right index finger without foreign body without damage to nail, initial encounter        Discharge Vital Signs:  Blood pressure 126/72, pulse 64, temperature 98.1 °F (36.7 °C), temperature source Oral, resp. rate 14, height 5' 5\" (1.651 m), weight 150 lb (68 kg), last menstrual period 04/22/2022, SpO2 100 %. DISPOSITION  Patient was discharged to home in good condition.             [place], [place]  05/23/22 [id number]

## 2022-12-07 ENCOUNTER — OFFICE VISIT (OUTPATIENT)
Dept: FAMILY MEDICINE CLINIC | Age: 27
End: 2022-12-07
Payer: COMMERCIAL

## 2022-12-07 ENCOUNTER — NURSE TRIAGE (OUTPATIENT)
Dept: OTHER | Facility: CLINIC | Age: 27
End: 2022-12-07

## 2022-12-07 VITALS
HEIGHT: 65 IN | SYSTOLIC BLOOD PRESSURE: 112 MMHG | WEIGHT: 164.4 LBS | HEART RATE: 104 BPM | OXYGEN SATURATION: 97 % | BODY MASS INDEX: 27.39 KG/M2 | DIASTOLIC BLOOD PRESSURE: 82 MMHG | RESPIRATION RATE: 16 BRPM

## 2022-12-07 DIAGNOSIS — J06.9 VIRAL URI: Primary | ICD-10-CM

## 2022-12-07 PROBLEM — J01.90 ACUTE SINUSITIS: Status: ACTIVE | Noted: 2022-02-25

## 2022-12-07 PROBLEM — R06.02 SOB (SHORTNESS OF BREATH): Status: ACTIVE | Noted: 2018-02-20

## 2022-12-07 PROBLEM — Z20.822 EXPOSURE TO COVID-19 VIRUS: Status: ACTIVE | Noted: 2022-02-25

## 2022-12-07 PROBLEM — R09.81 NASAL CONGESTION: Status: ACTIVE | Noted: 2022-02-25

## 2022-12-07 PROCEDURE — 99203 OFFICE O/P NEW LOW 30 MIN: CPT

## 2022-12-07 PROCEDURE — G8419 CALC BMI OUT NRM PARAM NOF/U: HCPCS

## 2022-12-07 PROCEDURE — 1036F TOBACCO NON-USER: CPT

## 2022-12-07 PROCEDURE — G8427 DOCREV CUR MEDS BY ELIG CLIN: HCPCS

## 2022-12-07 PROCEDURE — G8484 FLU IMMUNIZE NO ADMIN: HCPCS

## 2022-12-07 RX ORDER — NORELGESTROMIN AND ETHINYL ESTRADIOL 150; 35 UG/D; UG/D
PATCH TRANSDERMAL
COMMUNITY
Start: 2022-09-17

## 2022-12-07 SDOH — HEALTH STABILITY: PHYSICAL HEALTH: ON AVERAGE, HOW MANY DAYS PER WEEK DO YOU ENGAGE IN MODERATE TO STRENUOUS EXERCISE (LIKE A BRISK WALK)?: 6 DAYS

## 2022-12-07 SDOH — HEALTH STABILITY: PHYSICAL HEALTH: ON AVERAGE, HOW MANY MINUTES DO YOU ENGAGE IN EXERCISE AT THIS LEVEL?: 40 MIN

## 2022-12-07 ASSESSMENT — ENCOUNTER SYMPTOMS
SORE THROAT: 0
DIARRHEA: 0
BLOOD IN STOOL: 0
SHORTNESS OF BREATH: 0
ABDOMINAL PAIN: 0
COUGH: 0
CONSTIPATION: 0
WHEEZING: 0
COLOR CHANGE: 0

## 2022-12-07 ASSESSMENT — PATIENT HEALTH QUESTIONNAIRE - PHQ9
5. POOR APPETITE OR OVEREATING: 0
1. LITTLE INTEREST OR PLEASURE IN DOING THINGS: 0
4. FEELING TIRED OR HAVING LITTLE ENERGY: 0
3. TROUBLE FALLING OR STAYING ASLEEP: 0
SUM OF ALL RESPONSES TO PHQ QUESTIONS 1-9: 0
8. MOVING OR SPEAKING SO SLOWLY THAT OTHER PEOPLE COULD HAVE NOTICED. OR THE OPPOSITE, BEING SO FIGETY OR RESTLESS THAT YOU HAVE BEEN MOVING AROUND A LOT MORE THAN USUAL: 0
9. THOUGHTS THAT YOU WOULD BE BETTER OFF DEAD, OR OF HURTING YOURSELF: 0
2. FEELING DOWN, DEPRESSED OR HOPELESS: 0
7. TROUBLE CONCENTRATING ON THINGS, SUCH AS READING THE NEWSPAPER OR WATCHING TELEVISION: 0
SUM OF ALL RESPONSES TO PHQ9 QUESTIONS 1 & 2: 0
SUM OF ALL RESPONSES TO PHQ QUESTIONS 1-9: 0
SUM OF ALL RESPONSES TO PHQ QUESTIONS 1-9: 0
6. FEELING BAD ABOUT YOURSELF - OR THAT YOU ARE A FAILURE OR HAVE LET YOURSELF OR YOUR FAMILY DOWN: 0
10. IF YOU CHECKED OFF ANY PROBLEMS, HOW DIFFICULT HAVE THESE PROBLEMS MADE IT FOR YOU TO DO YOUR WORK, TAKE CARE OF THINGS AT HOME, OR GET ALONG WITH OTHER PEOPLE: 0
SUM OF ALL RESPONSES TO PHQ QUESTIONS 1-9: 0

## 2022-12-07 NOTE — PROGRESS NOTES
Nena Caceres (:  1995) is a 28 y.o. female,New patient, here for evaluation of the following chief complaint(s):  Establish Care (Pt is here to establish care, states that she has been having body aches,,epigastric pain, ) and Generalized Body Aches         ASSESSMENT/PLAN:  1. Viral URI  Patient's symptoms are very consistent with URI. Patient does have multiple sick exposures. Patient's flu test was negative at this time. Patient was encouraged if she continues to have symptoms that would be recommended that she have a COVID test we do not have rapid in office these would be a send out. Patient was encouraged to take over-the-counter cold and flu medication. Advised patient of medications that would be best suited for her. Patient is agreeable and understands at this time. Patient's epigastric pain is most likely related to some slight heartburn patient denies wanting medication for this. Encourage patient to take over-the-counter Pepcid or Tums to help reduce some stomach acid. Patient is agreeable to plan at this time. Patient was educated on worsening respiratory symptoms when to seek further care. Patient is agreeable. Patient was also educated that if she does have more chest pain that she will need to be seen in the ER for further evaluation. Patient again is understanding of education that was provided to her. Return in about 1 year (around 2023) for well check . Subjective   SUBJECTIVE/OBJECTIVE:  HPI  Patient presents the office today as a new patient here to establish care. Patient also does have acute complaints. She states that 2 days ago she developed some general aches and pains along with some generalized body aches. She states that she has not taken any over-the-counter medication. She describes some epigastric kind of pain that does not radiate up to the center of her chest and noticed has lingering effects.   Patient denies any cough shortness of breath or difficulty breathing patient also endorses no COVID symptoms such as loss of taste or smell or diarrhea. Patient endorses having some minor flulike symptoms including body aches unaware if she has had a fever. Denies any cough at this time. 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 Patient has no other acute complaints at this time. Review of Systems   Constitutional:  Positive for fatigue. Negative for chills. HENT:  Negative for congestion and sore throat. Respiratory:  Negative for cough, shortness of breath and wheezing. Cardiovascular:  Negative for chest pain and leg swelling. Gastrointestinal:  Negative for abdominal pain, blood in stool, constipation and diarrhea. Endocrine: Negative for cold intolerance and heat intolerance. Genitourinary:  Negative for difficulty urinating, hematuria and urgency. Musculoskeletal:  Negative for arthralgias and gait problem. Skin:  Negative for color change. Neurological:  Negative for dizziness, seizures, light-headedness and headaches. Psychiatric/Behavioral:  Negative for agitation and confusion. The patient is not nervous/anxious. Objective   Physical Exam  Vitals reviewed. Constitutional:       General: She is not in acute distress. Appearance: Normal appearance. She is obese. HENT:      Right Ear: Tympanic membrane normal.      Left Ear: Tympanic membrane normal.   Eyes:      Pupils: Pupils are equal, round, and reactive to light. Cardiovascular:      Rate and Rhythm: Normal rate and regular rhythm. Pulmonary:      Effort: Pulmonary effort is normal.      Breath sounds: Normal breath sounds. Abdominal:      General: Abdomen is flat. Bowel sounds are normal.      Palpations: Abdomen is soft. Musculoskeletal:         General: Normal range of motion. Skin:     General: Skin is warm. Neurological:      General: No focal deficit present. Mental Status: She is alert. Psychiatric:         Mood and Affect: Mood normal.         Behavior: Behavior normal.         Judgment: Judgment normal.          This note was generated completely or in part utilizing Dragon dictation speech recognition software. Occasionally, words are mistranscribed and despite editing, the text may contain inaccuracies due to incorrect word recognition. If further clarification is needed please contact the office at [phone]. An electronic signature was used to authenticate this note.     --Tayler Cole, DELIO - CNP

## 2022-12-07 NOTE — TELEPHONE ENCOUNTER
Location of patient: [place]    Received call from [facility]; Patient with Red Flag Complaint requesting to establish care with [facility] . Subjective: Caller states \"she is having chest tightness and feeling unwell\"     Current Symptoms: body aches, chest tightness    Onset: 2 day ago;     Pain Severity: chest pain    Temperature: denies     What has been tried: NA    Recommended disposition: Go to ED/UCC Now (Or to Office with PCP Approval)    Care advice provided, patient verbalizes understanding; denies any other questions or concerns; instructed to call back for any new or worsening symptoms. Patient/Caller agrees with recommended disposition; writer provided warm transfer to [facility] for appointment scheduling    Attention Provider: Thank you for allowing me to participate in the care of your patient. The patient was connected to triage in response to information provided to the ECC. Please do not respond through this encounter as the response is not directed to a shared pool.       Reason for Disposition   Chest pain lasting longer than 5 minutes and occurred in last 3 days (72 hours) (Exception: feels exactly the same as previously diagnosed heartburn and has accompanying sour taste in mouth)    Protocols used: Chest Pain-ADULT-[place] [Other: ____] : [unfilled]

## 2023-03-24 ENCOUNTER — APPOINTMENT (OUTPATIENT)
Dept: GENERAL RADIOLOGY | Age: 28
End: 2023-03-24
Payer: COMMERCIAL

## 2023-03-24 ENCOUNTER — TELEMEDICINE (OUTPATIENT)
Dept: PRIMARY CARE CLINIC | Age: 28
End: 2023-03-24

## 2023-03-24 ENCOUNTER — HOSPITAL ENCOUNTER (EMERGENCY)
Age: 28
Discharge: HOME | End: 2023-03-24
Payer: COMMERCIAL

## 2023-03-24 VITALS
DIASTOLIC BLOOD PRESSURE: 73 MMHG | SYSTOLIC BLOOD PRESSURE: 126 MMHG | HEART RATE: 98 BPM | OXYGEN SATURATION: 100 % | RESPIRATION RATE: 18 BRPM | TEMPERATURE: 98.4 F

## 2023-03-24 DIAGNOSIS — R11.0 NAUSEA: Primary | ICD-10-CM

## 2023-03-24 DIAGNOSIS — S93.601A SPRAIN OF RIGHT FOOT, INITIAL ENCOUNTER: Primary | ICD-10-CM
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
## 2023-03-24 PROCEDURE — 6370000000 HC RX 637 (ALT 250 FOR IP): Performed by: NURSE PRACTITIONER

## 2023-03-24 PROCEDURE — 6360000002 HC RX W HCPCS: Performed by: NURSE PRACTITIONER

## 2023-03-24 PROCEDURE — 73610 X-RAY EXAM OF ANKLE: CPT

## 2023-03-24 PROCEDURE — 73630 X-RAY EXAM OF FOOT: CPT

## 2023-03-24 RX ORDER — MORPHINE SULFATE 4 MG/ML
6 INJECTION, SOLUTION INTRAMUSCULAR; INTRAVENOUS ONCE
Status: COMPLETED | OUTPATIENT
Start: 2023-03-24 | End: 2023-03-24

## 2023-03-24 RX ORDER — OXYCODONE HYDROCHLORIDE AND ACETAMINOPHEN 5; 325 MG/1; MG/1
1 TABLET ORAL EVERY 6 HOURS PRN
Qty: 12 TABLET | Refills: 0 | Status: SHIPPED | OUTPATIENT
Start: 2023-03-24 | End: 2023-03-27

## 2023-03-24 RX ORDER — ONDANSETRON 4 MG/1
4 TABLET, ORALLY DISINTEGRATING ORAL ONCE
Status: COMPLETED | OUTPATIENT
Start: 2023-03-24 | End: 2023-03-24

## 2023-03-24 RX ORDER — ONDANSETRON 4 MG/1
4 TABLET, FILM COATED ORAL EVERY 8 HOURS PRN
Qty: 20 TABLET | Refills: 0 | Status: SHIPPED | OUTPATIENT
Start: 2023-03-24

## 2023-03-24 RX ADMIN — ONDANSETRON 4 MG: 4 TABLET, ORALLY DISINTEGRATING ORAL at 12:29

## 2023-03-24 RX ADMIN — MORPHINE SULFATE 6 MG: 4 INJECTION, SOLUTION INTRAMUSCULAR; INTRAVENOUS at 12:29

## 2023-03-24 ASSESSMENT — PAIN DESCRIPTION - LOCATION
LOCATION: FOOT

## 2023-03-24 ASSESSMENT — PAIN DESCRIPTION - DESCRIPTORS
DESCRIPTORS: ACHING
DESCRIPTORS: SHARP
DESCRIPTORS: ACHING
DESCRIPTORS: SHARP

## 2023-03-24 ASSESSMENT — PAIN SCALES - GENERAL
PAINLEVEL_OUTOF10: 2
PAINLEVEL_OUTOF10: 8
PAINLEVEL_OUTOF10: 2

## 2023-03-24 ASSESSMENT — PAIN - FUNCTIONAL ASSESSMENT
PAIN_FUNCTIONAL_ASSESSMENT: 0-10
PAIN_FUNCTIONAL_ASSESSMENT: 0-10

## 2023-03-24 ASSESSMENT — PAIN DESCRIPTION - ORIENTATION
ORIENTATION: RIGHT

## 2023-03-24 NOTE — PROGRESS NOTES
motion of neck        [] Abnormal -     Neurological:        [x] No Facial Asymmetry (Cranial nerve 7 motor function) (limited exam due to video visit)          [x] No gaze palsy        [] Abnormal -          Skin:        [x] No significant exanthematous lesions or discoloration noted on facial skin         [] Abnormal -            Psychiatric:       [x] Normal Affect [] Abnormal -         Other pertinent observable physical exam findings:-        On this date 3/24/2023 I have spent 15 minutes reviewing previous notes, test results and face to face (virtual) with the patient discussing the diagnosis and importance of compliance with the treatment plan as well as documenting on the day of the visit. Nena Stout, was evaluated through a synchronous (real-time) audio-video encounter. The patient (or guardian if applicable) is aware that this is a billable service, which includes applicable co-pays. This Virtual Visit was conducted with patient's (and/or legal guardian's) consent. The visit was conducted pursuant to the emergency declaration under the [address] [place] [address] authority and the scroll kit and [employer] Act. Patient identification was verified, and a caregiver was present when appropriate.    The patient was located at Home: [phone]  [facility] DELIO Iqbal

## 2023-03-24 NOTE — DISCHARGE INSTRUCTIONS
Do not take Tylenol or acetaminophen-containing products while taking Percocet. Sedation precautions while taking Percocet. Do not drink alcohol while taking Percocet. Do not drive or operate heavy machinery while taking Percocet.

## 2023-04-13 NOTE — ED PROVIDER NOTES
Morphine And Related Other (See Comments) and Nausea And Vomiting     Unknown reaction      Vicodin [Hydrocodone-Acetaminophen] Nausea And Vomiting       SOCIAL & FAMILY HISTORY    Social History     Socioeconomic History    Marital status: Single     Spouse name: None    Number of children: None    Years of education: None    Highest education level: None   Tobacco Use    Smoking status: Never    Smokeless tobacco: Never   Vaping Use    Vaping Use: Never used   Substance and Sexual Activity    Alcohol use: Yes     Alcohol/week: 3.0 standard drinks     Types: 3 Shots of liquor per week     Comment: \"3 or 5 drinks on the weeknds\"    Drug use: No    Sexual activity: Not Currently     Partners: Male     Social Determinants of Health     Physical Activity: Sufficiently Active    Days of Exercise per Week: 6 days    Minutes of Exercise per Session: 40 min   Intimate Partner Violence: Not At Risk    Fear of Current or Ex-Partner: No    Emotionally Abused: No    Physically Abused: No    Sexually Abused: No     Family History   Problem Relation Age of Onset    Diabetes Paternal Grandmother          PHYSICAL EXAM    VITAL SIGNS: /73   Pulse 98   Temp 98.4 °F (36.9 °C) (Oral)   Resp 18   LMP 02/24/2023   SpO2 100%   Constitutional:  Well developed, appears comfortable  HENT:  Atraumatic  Respiratory:  No retractions  Vascular: right DP pulse 2+. Brisk capillary refill to toes. Musculoskeletal: Right foot and ankle shows She is able to wiggle the toes freely. She has limited range of motion with dorsiflexion and plantarflexion. The foot is very edematous with bruising. There is edema noted to the lateral malleolus.  no bony tenderness of the medial malleolus, no navicular tenderness, no bony tenderness at the base of the fifth metatarsal; the ankle joint is stable, no intraosseous membrane tenderness, no proximal fibular tenderness  Integument:  No open wounds of the affected ankle, no erythema of the

## 2024-07-29 ENCOUNTER — OFFICE VISIT (OUTPATIENT)
Dept: FAMILY MEDICINE CLINIC | Age: 29
End: 2024-07-29
Payer: COMMERCIAL

## 2024-07-29 VITALS
HEART RATE: 79 BPM | OXYGEN SATURATION: 98 % | BODY MASS INDEX: 25.06 KG/M2 | SYSTOLIC BLOOD PRESSURE: 124 MMHG | WEIGHT: 150.4 LBS | DIASTOLIC BLOOD PRESSURE: 70 MMHG | HEIGHT: 65 IN

## 2024-07-29 DIAGNOSIS — F32.A ANXIETY AND DEPRESSION: Primary | ICD-10-CM

## 2024-07-29 DIAGNOSIS — F41.9 ANXIETY AND DEPRESSION: Primary | ICD-10-CM

## 2024-07-29 DIAGNOSIS — F51.04 PSYCHOPHYSIOLOGICAL INSOMNIA: ICD-10-CM

## 2024-07-29 LAB
ALBUMIN SERPL-MCNC: 4.1 G/DL (ref 3.4–5)
ALBUMIN/GLOB SERPL: 1.5 {RATIO} (ref 1.1–2.2)
ALP SERPL-CCNC: 55 U/L (ref 40–129)
ALT SERPL-CCNC: 11 U/L (ref 10–40)
ANION GAP SERPL CALCULATED.3IONS-SCNC: 15 MMOL/L (ref 3–16)
AST SERPL-CCNC: 16 U/L (ref 15–37)
BASOPHILS # BLD: 0 K/UL (ref 0–0.2)
BASOPHILS NFR BLD: 0.6 %
BILIRUB SERPL-MCNC: 0.5 MG/DL (ref 0–1)
BUN SERPL-MCNC: 14 MG/DL (ref 7–20)
CALCIUM SERPL-MCNC: 9.3 MG/DL (ref 8.3–10.6)
CHLORIDE SERPL-SCNC: 101 MMOL/L (ref 99–110)
CO2 SERPL-SCNC: 23 MMOL/L (ref 21–32)
CREAT SERPL-MCNC: 0.6 MG/DL (ref 0.6–1.1)
DEPRECATED RDW RBC AUTO: 12.9 % (ref 12.4–15.4)
EOSINOPHIL # BLD: 0.2 K/UL (ref 0–0.6)
EOSINOPHIL NFR BLD: 2.4 %
GFR SERPLBLD CREATININE-BSD FMLA CKD-EPI: >90 ML/MIN/{1.73_M2}
GLUCOSE SERPL-MCNC: 81 MG/DL (ref 70–99)
HCT VFR BLD AUTO: 38.4 % (ref 36–48)
HGB BLD-MCNC: 13.1 G/DL (ref 12–16)
LYMPHOCYTES # BLD: 1.4 K/UL (ref 1–5.1)
LYMPHOCYTES NFR BLD: 21.1 %
MCH RBC QN AUTO: 31.8 PG (ref 26–34)
MCHC RBC AUTO-ENTMCNC: 34.1 G/DL (ref 31–36)
MCV RBC AUTO: 93.3 FL (ref 80–100)
MONOCYTES # BLD: 0.5 K/UL (ref 0–1.3)
MONOCYTES NFR BLD: 7.1 %
NEUTROPHILS # BLD: 4.6 K/UL (ref 1.7–7.7)
NEUTROPHILS NFR BLD: 68.8 %
PLATELET # BLD AUTO: 272 K/UL (ref 135–450)
PMV BLD AUTO: 8.6 FL (ref 5–10.5)
POTASSIUM SERPL-SCNC: 4.2 MMOL/L (ref 3.5–5.1)
PROT SERPL-MCNC: 6.8 G/DL (ref 6.4–8.2)
RBC # BLD AUTO: 4.11 M/UL (ref 4–5.2)
SODIUM SERPL-SCNC: 139 MMOL/L (ref 136–145)
TSH SERPL DL<=0.005 MIU/L-ACNC: 0.68 UIU/ML (ref 0.27–4.2)
WBC # BLD AUTO: 6.7 K/UL (ref 4–11)

## 2024-07-29 PROCEDURE — 99214 OFFICE O/P EST MOD 30 MIN: CPT

## 2024-07-29 RX ORDER — HYDROXYZINE HYDROCHLORIDE 25 MG/1
25 TABLET, FILM COATED ORAL NIGHTLY
Qty: 30 TABLET | Refills: 0 | Status: SHIPPED | OUTPATIENT
Start: 2024-07-29 | End: 2024-08-28

## 2024-07-29 RX ORDER — VENLAFAXINE HYDROCHLORIDE 37.5 MG/1
37.5 CAPSULE, EXTENDED RELEASE ORAL DAILY
Qty: 30 CAPSULE | Refills: 0 | Status: SHIPPED | OUTPATIENT
Start: 2024-07-29

## 2024-07-29 SDOH — ECONOMIC STABILITY: FOOD INSECURITY: WITHIN THE PAST 12 MONTHS, THE FOOD YOU BOUGHT JUST DIDN'T LAST AND YOU DIDN'T HAVE MONEY TO GET MORE.: NEVER TRUE

## 2024-07-29 SDOH — ECONOMIC STABILITY: FOOD INSECURITY: WITHIN THE PAST 12 MONTHS, YOU WORRIED THAT YOUR FOOD WOULD RUN OUT BEFORE YOU GOT MONEY TO BUY MORE.: NEVER TRUE

## 2024-07-29 SDOH — ECONOMIC STABILITY: INCOME INSECURITY: HOW HARD IS IT FOR YOU TO PAY FOR THE VERY BASICS LIKE FOOD, HOUSING, MEDICAL CARE, AND HEATING?: NOT HARD AT ALL

## 2024-07-29 SDOH — ECONOMIC STABILITY: HOUSING INSECURITY
IN THE LAST 12 MONTHS, WAS THERE A TIME WHEN YOU DID NOT HAVE A STEADY PLACE TO SLEEP OR SLEPT IN A SHELTER (INCLUDING NOW)?: NO

## 2024-07-29 ASSESSMENT — ANXIETY QUESTIONNAIRES
7. FEELING AFRAID AS IF SOMETHING AWFUL MIGHT HAPPEN: NEARLY EVERY DAY
3. WORRYING TOO MUCH ABOUT DIFFERENT THINGS: NEARLY EVERY DAY
5. BEING SO RESTLESS THAT IT IS HARD TO SIT STILL: NEARLY EVERY DAY
2. NOT BEING ABLE TO STOP OR CONTROL WORRYING: NEARLY EVERY DAY
GAD7 TOTAL SCORE: 21
4. TROUBLE RELAXING: NEARLY EVERY DAY
1. FEELING NERVOUS, ANXIOUS, OR ON EDGE: NEARLY EVERY DAY
IF YOU CHECKED OFF ANY PROBLEMS ON THIS QUESTIONNAIRE, HOW DIFFICULT HAVE THESE PROBLEMS MADE IT FOR YOU TO DO YOUR WORK, TAKE CARE OF THINGS AT HOME, OR GET ALONG WITH OTHER PEOPLE: SOMEWHAT DIFFICULT
6. BECOMING EASILY ANNOYED OR IRRITABLE: NEARLY EVERY DAY

## 2024-07-29 ASSESSMENT — PATIENT HEALTH QUESTIONNAIRE - PHQ9
2. FEELING DOWN, DEPRESSED OR HOPELESS: NEARLY EVERY DAY
3. TROUBLE FALLING OR STAYING ASLEEP: NEARLY EVERY DAY
SUM OF ALL RESPONSES TO PHQ QUESTIONS 1-9: 20
SUM OF ALL RESPONSES TO PHQ QUESTIONS 1-9: 20
8. MOVING OR SPEAKING SO SLOWLY THAT OTHER PEOPLE COULD HAVE NOTICED. OR THE OPPOSITE, BEING SO FIGETY OR RESTLESS THAT YOU HAVE BEEN MOVING AROUND A LOT MORE THAN USUAL: NEARLY EVERY DAY
5. POOR APPETITE OR OVEREATING: NEARLY EVERY DAY
9. THOUGHTS THAT YOU WOULD BE BETTER OFF DEAD, OR OF HURTING YOURSELF: NOT AT ALL
SUM OF ALL RESPONSES TO PHQ QUESTIONS 1-9: 20
7. TROUBLE CONCENTRATING ON THINGS, SUCH AS READING THE NEWSPAPER OR WATCHING TELEVISION: NEARLY EVERY DAY
1. LITTLE INTEREST OR PLEASURE IN DOING THINGS: SEVERAL DAYS
4. FEELING TIRED OR HAVING LITTLE ENERGY: NEARLY EVERY DAY
10. IF YOU CHECKED OFF ANY PROBLEMS, HOW DIFFICULT HAVE THESE PROBLEMS MADE IT FOR YOU TO DO YOUR WORK, TAKE CARE OF THINGS AT HOME, OR GET ALONG WITH OTHER PEOPLE: SOMEWHAT DIFFICULT
SUM OF ALL RESPONSES TO PHQ9 QUESTIONS 1 & 2: 4
SUM OF ALL RESPONSES TO PHQ QUESTIONS 1-9: 20

## 2024-07-29 ASSESSMENT — ENCOUNTER SYMPTOMS
WHEEZING: 0
BLOOD IN STOOL: 0
CONSTIPATION: 0
COLOR CHANGE: 0
COUGH: 0
DIARRHEA: 0
SORE THROAT: 0
SHORTNESS OF BREATH: 0
ABDOMINAL PAIN: 0

## 2024-07-29 ASSESSMENT — COLUMBIA-SUICIDE SEVERITY RATING SCALE - C-SSRS
1. WITHIN THE PAST MONTH, HAVE YOU WISHED YOU WERE DEAD OR WISHED YOU COULD GO TO SLEEP AND NOT WAKE UP?: NO
6. HAVE YOU EVER DONE ANYTHING, STARTED TO DO ANYTHING, OR PREPARED TO DO ANYTHING TO END YOUR LIFE?: NO
2. HAVE YOU ACTUALLY HAD ANY THOUGHTS OF KILLING YOURSELF?: NO
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
## 2024-08-28 ENCOUNTER — OFFICE VISIT (OUTPATIENT)
Dept: FAMILY MEDICINE CLINIC | Age: 29
End: 2024-08-28
Payer: COMMERCIAL

## 2024-08-28 VITALS
WEIGHT: 148.8 LBS | BODY MASS INDEX: 24.79 KG/M2 | OXYGEN SATURATION: 97 % | HEIGHT: 65 IN | SYSTOLIC BLOOD PRESSURE: 122 MMHG | HEART RATE: 76 BPM | DIASTOLIC BLOOD PRESSURE: 70 MMHG

## 2024-08-28 DIAGNOSIS — F41.9 ANXIETY AND DEPRESSION: ICD-10-CM

## 2024-08-28 DIAGNOSIS — F32.A ANXIETY AND DEPRESSION: ICD-10-CM

## 2024-08-28 DIAGNOSIS — F51.04 PSYCHOPHYSIOLOGICAL INSOMNIA: ICD-10-CM

## 2024-08-28 PROCEDURE — G8420 CALC BMI NORM PARAMETERS: HCPCS

## 2024-08-28 PROCEDURE — 1036F TOBACCO NON-USER: CPT

## 2024-08-28 PROCEDURE — 99214 OFFICE O/P EST MOD 30 MIN: CPT

## 2024-08-28 PROCEDURE — G8427 DOCREV CUR MEDS BY ELIG CLIN: HCPCS

## 2024-08-28 RX ORDER — VENLAFAXINE HYDROCHLORIDE 75 MG/1
75 CAPSULE, EXTENDED RELEASE ORAL DAILY
Qty: 30 CAPSULE | Refills: 0 | Status: SHIPPED | OUTPATIENT
Start: 2024-08-28 | End: 2024-09-27

## 2024-08-28 RX ORDER — HYDROXYZINE HYDROCHLORIDE 25 MG/1
25 TABLET, FILM COATED ORAL NIGHTLY
Qty: 30 TABLET | Refills: 0 | Status: SHIPPED | OUTPATIENT
Start: 2024-08-28 | End: 2024-09-27

## 2024-08-28 ASSESSMENT — ANXIETY QUESTIONNAIRES
6. BECOMING EASILY ANNOYED OR IRRITABLE: MORE THAN HALF THE DAYS
3. WORRYING TOO MUCH ABOUT DIFFERENT THINGS: NEARLY EVERY DAY
1. FEELING NERVOUS, ANXIOUS, OR ON EDGE: NEARLY EVERY DAY
7. FEELING AFRAID AS IF SOMETHING AWFUL MIGHT HAPPEN: SEVERAL DAYS
4. TROUBLE RELAXING: MORE THAN HALF THE DAYS
5. BEING SO RESTLESS THAT IT IS HARD TO SIT STILL: NOT AT ALL
IF YOU CHECKED OFF ANY PROBLEMS ON THIS QUESTIONNAIRE, HOW DIFFICULT HAVE THESE PROBLEMS MADE IT FOR YOU TO DO YOUR WORK, TAKE CARE OF THINGS AT HOME, OR GET ALONG WITH OTHER PEOPLE: SOMEWHAT DIFFICULT
GAD7 TOTAL SCORE: 13
2. NOT BEING ABLE TO STOP OR CONTROL WORRYING: MORE THAN HALF THE DAYS

## 2024-08-28 ASSESSMENT — PATIENT HEALTH QUESTIONNAIRE - PHQ9
9. THOUGHTS THAT YOU WOULD BE BETTER OFF DEAD, OR OF HURTING YOURSELF: NOT AT ALL
5. POOR APPETITE OR OVEREATING: NOT AT ALL
10. IF YOU CHECKED OFF ANY PROBLEMS, HOW DIFFICULT HAVE THESE PROBLEMS MADE IT FOR YOU TO DO YOUR WORK, TAKE CARE OF THINGS AT HOME, OR GET ALONG WITH OTHER PEOPLE: SOMEWHAT DIFFICULT
8. MOVING OR SPEAKING SO SLOWLY THAT OTHER PEOPLE COULD HAVE NOTICED. OR THE OPPOSITE, BEING SO FIGETY OR RESTLESS THAT YOU HAVE BEEN MOVING AROUND A LOT MORE THAN USUAL: NOT AT ALL
6. FEELING BAD ABOUT YOURSELF - OR THAT YOU ARE A FAILURE OR HAVE LET YOURSELF OR YOUR FAMILY DOWN: NOT AT ALL
SUM OF ALL RESPONSES TO PHQ QUESTIONS 1-9: 8
SUM OF ALL RESPONSES TO PHQ9 QUESTIONS 1 & 2: 3
SUM OF ALL RESPONSES TO PHQ QUESTIONS 1-9: 8
2. FEELING DOWN, DEPRESSED OR HOPELESS: SEVERAL DAYS
7. TROUBLE CONCENTRATING ON THINGS, SUCH AS READING THE NEWSPAPER OR WATCHING TELEVISION: MORE THAN HALF THE DAYS
4. FEELING TIRED OR HAVING LITTLE ENERGY: NEARLY EVERY DAY
3. TROUBLE FALLING OR STAYING ASLEEP: NOT AT ALL
1. LITTLE INTEREST OR PLEASURE IN DOING THINGS: MORE THAN HALF THE DAYS

## 2024-08-28 ASSESSMENT — ENCOUNTER SYMPTOMS
SHORTNESS OF BREATH: 0
DIARRHEA: 0
ABDOMINAL PAIN: 0
CONSTIPATION: 0
BLOOD IN STOOL: 0
COLOR CHANGE: 0
SORE THROAT: 0
COUGH: 0
WHEEZING: 0

## 2024-08-28 NOTE — PROGRESS NOTES
Nena Colunga (:  1995) is a 28 y.o. female,Established patient, here for evaluation of the following chief complaint(s):  Anxiety and Depression (Patient would like to change medication due to having headaches)      Assessment & Plan   ASSESSMENT/PLAN:  1. Anxiety and depression  -     venlafaxine (EFFEXOR XR) 75 MG extended release capsule; Take 1 capsule by mouth daily, Disp-30 capsule, R-0Normal  - Patient struggling with anxiety and depression overall she feels that she is doing better reports less symptoms.  PHQ scores are significantly improved compared to previous as well as AISHA 7 scores.  She just does not feel herself still.  Could partially be due to elevation in anxiety and depression.  After further discussion with patient it is felt that she would benefit from psychology as well as increase venlafaxine to 75 mg.  Patient was agreeable and understands medication medication side effects.  2. Psychophysiological insomnia  -     hydrOXYzine HCl (ATARAX) 25 MG tablet; Take 1 tablet by mouth nightly, Disp-30 tablet, R-0Normal  -Sleeping much better at night hydroxyzine is very helpful.  Not taking every night but only when she needs it.  No side effects to medication        2024     3:15 PM 2024    10:51 AM 2022     1:22 PM   PHQ Scores   PHQ2 Score 3 4 0   PHQ9 Score 8 20 0          2024     3:16 PM 2024    10:54 AM   AISHA-7 SCREENING   Feeling nervous, anxious, or on edge Nearly every day Nearly every day   Not being able to stop or control worrying More than half the days Nearly every day   Worrying too much about different things Nearly every day Nearly every day   Trouble relaxing More than half the days Nearly every day   Being so restless that it is hard to sit still Not at all Nearly every day   Becoming easily annoyed or irritable More than half the days Nearly every day   Feeling afraid as if something awful might happen Several days Nearly every day   AISHA-7  equal, round, and reactive to light.   Cardiovascular:      Rate and Rhythm: Normal rate and regular rhythm.   Pulmonary:      Effort: Pulmonary effort is normal.      Breath sounds: Normal breath sounds.   Abdominal:      General: Abdomen is flat. Bowel sounds are normal.      Palpations: Abdomen is soft.   Musculoskeletal:         General: Normal range of motion.   Skin:     General: Skin is warm.   Neurological:      General: No focal deficit present.      Mental Status: She is alert.   Psychiatric:         Mood and Affect: Mood normal.         Behavior: Behavior normal.         Judgment: Judgment normal.            This note was generated completely or in part utilizing Dragon dictation speech recognition software.  Occasionally, words are mistranscribed and despite editing, the text may contain inaccuracies due to incorrect word recognition.  If further clarification is needed please contact the office at [phone].     An electronic signature was used to authenticate this note.    --DELIO Richards - CNP

## 2024-09-25 DIAGNOSIS — F41.9 ANXIETY AND DEPRESSION: ICD-10-CM

## 2024-09-25 DIAGNOSIS — F32.A ANXIETY AND DEPRESSION: ICD-10-CM

## 2024-09-25 RX ORDER — VENLAFAXINE HYDROCHLORIDE 75 MG/1
75 CAPSULE, EXTENDED RELEASE ORAL DAILY
Qty: 30 CAPSULE | Refills: 0 | Status: SHIPPED | OUTPATIENT
Start: 2024-09-25 | End: 2024-10-25

## 2024-09-26 DIAGNOSIS — F41.9 ANXIETY AND DEPRESSION: ICD-10-CM

## 2024-09-26 DIAGNOSIS — F32.A ANXIETY AND DEPRESSION: ICD-10-CM

## 2024-09-26 RX ORDER — VENLAFAXINE HYDROCHLORIDE 75 MG/1
75 CAPSULE, EXTENDED RELEASE ORAL DAILY
Qty: 30 CAPSULE | Refills: 0 | OUTPATIENT
Start: 2024-09-26 | End: 2024-10-26

## 2024-10-26 DIAGNOSIS — F41.9 ANXIETY AND DEPRESSION: ICD-10-CM

## 2024-10-26 DIAGNOSIS — F32.A ANXIETY AND DEPRESSION: ICD-10-CM

## 2024-10-28 DIAGNOSIS — F41.9 ANXIETY AND DEPRESSION: ICD-10-CM

## 2024-10-28 DIAGNOSIS — F32.A ANXIETY AND DEPRESSION: ICD-10-CM

## 2024-10-28 RX ORDER — VENLAFAXINE HYDROCHLORIDE 75 MG/1
75 CAPSULE, EXTENDED RELEASE ORAL DAILY
Qty: 30 CAPSULE | Refills: 2 | Status: SHIPPED | OUTPATIENT
Start: 2024-10-28 | End: 2025-01-26

## 2024-10-29 RX ORDER — VENLAFAXINE HYDROCHLORIDE 75 MG/1
75 CAPSULE, EXTENDED RELEASE ORAL DAILY
Qty: 30 CAPSULE | Refills: 0 | OUTPATIENT
Start: 2024-10-29 | End: 2024-11-28

## 2024-10-30 ENCOUNTER — HOSPITAL ENCOUNTER (EMERGENCY)
Age: 29
Discharge: HOME | End: 2024-10-30
Payer: COMMERCIAL

## 2024-10-30 VITALS
TEMPERATURE: 98.9 F | WEIGHT: 146.2 LBS | HEART RATE: 78 BPM | BODY MASS INDEX: 24.33 KG/M2 | RESPIRATION RATE: 16 BRPM | SYSTOLIC BLOOD PRESSURE: 124 MMHG | DIASTOLIC BLOOD PRESSURE: 94 MMHG | OXYGEN SATURATION: 100 %

## 2024-10-30 DIAGNOSIS — R11.2 NAUSEA AND VOMITING, UNSPECIFIED VOMITING TYPE: Primary | ICD-10-CM

## 2024-10-30 LAB
ALBUMIN SERPL-MCNC: 4.4 G/DL (ref 3.4–5)
ALBUMIN/GLOB SERPL: 1.5 {RATIO} (ref 1.1–2.2)
ALP SERPL-CCNC: 58 U/L (ref 40–129)
ALT SERPL-CCNC: 8 U/L (ref 10–40)
ANION GAP SERPL CALCULATED.3IONS-SCNC: 14 MMOL/L (ref 3–16)
AST SERPL-CCNC: 16 U/L (ref 15–37)
BASOPHILS # BLD: 0 K/UL (ref 0–0.2)
BASOPHILS NFR BLD: 0.2 %
BILIRUB SERPL-MCNC: 0.3 MG/DL (ref 0–1)
BILIRUB UR QL STRIP.AUTO: ABNORMAL
BUN SERPL-MCNC: 15 MG/DL (ref 7–20)
CALCIUM SERPL-MCNC: 9 MG/DL (ref 8.3–10.6)
CHLORIDE SERPL-SCNC: 101 MMOL/L (ref 99–110)
CLARITY UR: CLEAR
CO2 SERPL-SCNC: 25 MMOL/L (ref 21–32)
COLOR UR: YELLOW
CREAT SERPL-MCNC: 0.7 MG/DL (ref 0.6–1.1)
DEPRECATED RDW RBC AUTO: 12.7 % (ref 12.4–15.4)
EOSINOPHIL # BLD: 0 K/UL (ref 0–0.6)
EOSINOPHIL NFR BLD: 0.4 %
EPI CELLS #/AREA URNS HPF: ABNORMAL /HPF (ref 0–5)
GFR SERPLBLD CREATININE-BSD FMLA CKD-EPI: >90 ML/MIN/{1.73_M2}
GLUCOSE SERPL-MCNC: 102 MG/DL (ref 70–99)
GLUCOSE UR STRIP.AUTO-MCNC: NEGATIVE MG/DL
HCT VFR BLD AUTO: 44.3 % (ref 36–48)
HGB BLD-MCNC: 15 G/DL (ref 12–16)
HGB UR QL STRIP.AUTO: NEGATIVE
KETONES UR STRIP.AUTO-MCNC: 15 MG/DL
LEUKOCYTE ESTERASE UR QL STRIP.AUTO: NEGATIVE
LIPASE SERPL-CCNC: 25 U/L (ref 13–60)
LYMPHOCYTES # BLD: 0.9 K/UL (ref 1–5.1)
LYMPHOCYTES NFR BLD: 11.6 %
MCH RBC QN AUTO: 31.6 PG (ref 26–34)
MCHC RBC AUTO-ENTMCNC: 33.9 G/DL (ref 31–36)
MCV RBC AUTO: 93.3 FL (ref 80–100)
MONOCYTES # BLD: 0.6 K/UL (ref 0–1.3)
MONOCYTES NFR BLD: 8.4 %
NEUTROPHILS # BLD: 6 K/UL (ref 1.7–7.7)
NEUTROPHILS NFR BLD: 79.4 %
NITRITE UR QL STRIP.AUTO: NEGATIVE
PH UR STRIP.AUTO: 5.5 [PH] (ref 5–8)
PLATELET # BLD AUTO: 304 K/UL (ref 135–450)
PMV BLD AUTO: 8 FL (ref 5–10.5)
POTASSIUM SERPL-SCNC: 3.6 MMOL/L (ref 3.5–5.1)
PROT SERPL-MCNC: 7.3 G/DL (ref 6.4–8.2)
PROT UR STRIP.AUTO-MCNC: NEGATIVE MG/DL
RBC # BLD AUTO: 4.75 M/UL (ref 4–5.2)
RBC #/AREA URNS HPF: ABNORMAL /HPF (ref 0–4)
SODIUM SERPL-SCNC: 140 MMOL/L (ref 136–145)
SP GR UR STRIP.AUTO: >=1.03 (ref 1–1.03)
UA DIPSTICK W REFLEX MICRO PNL UR: ABNORMAL
URN SPEC COLLECT METH UR: ABNORMAL
UROBILINOGEN UR STRIP-ACNC: 0.2 E.U./DL
WBC # BLD AUTO: 7.5 K/UL (ref 4–11)
WBC #/AREA URNS HPF: ABNORMAL /HPF (ref 0–5)

## 2024-10-30 PROCEDURE — 83690 ASSAY OF LIPASE: CPT

## 2024-10-30 PROCEDURE — 85025 COMPLETE CBC W/AUTO DIFF WBC: CPT

## 2024-10-30 PROCEDURE — 6360000002 HC RX W HCPCS: Performed by: NURSE PRACTITIONER

## 2024-10-30 PROCEDURE — 96374 THER/PROPH/DIAG INJ IV PUSH: CPT

## 2024-10-30 PROCEDURE — 81001 URINALYSIS AUTO W/SCOPE: CPT

## 2024-10-30 PROCEDURE — 36415 COLL VENOUS BLD VENIPUNCTURE: CPT

## 2024-10-30 PROCEDURE — 80053 COMPREHEN METABOLIC PANEL: CPT

## 2024-10-30 PROCEDURE — 99284 EMERGENCY DEPT VISIT MOD MDM: CPT

## 2024-10-30 RX ORDER — ONDANSETRON 4 MG/1
4 TABLET, FILM COATED ORAL EVERY 8 HOURS PRN
Qty: 20 TABLET | Refills: 0 | Status: SHIPPED | OUTPATIENT
Start: 2024-10-30 | End: 2024-10-30 | Stop reason: CLARIF

## 2024-10-30 RX ORDER — ONDANSETRON 2 MG/ML
4 INJECTION INTRAMUSCULAR; INTRAVENOUS ONCE
Status: COMPLETED | OUTPATIENT
Start: 2024-10-30 | End: 2024-10-30

## 2024-10-30 RX ORDER — ONDANSETRON 4 MG/1
4 TABLET, ORALLY DISINTEGRATING ORAL 3 TIMES DAILY PRN
Qty: 21 TABLET | Refills: 0 | Status: SHIPPED | OUTPATIENT
Start: 2024-10-30

## 2024-10-30 RX ADMIN — ONDANSETRON 4 MG: 2 INJECTION INTRAMUSCULAR; INTRAVENOUS at 09:34

## 2024-10-30 ASSESSMENT — PAIN SCALES - GENERAL: PAINLEVEL_OUTOF10: 0

## 2024-10-30 ASSESSMENT — PAIN - FUNCTIONAL ASSESSMENT
PAIN_FUNCTIONAL_ASSESSMENT: NONE - DENIES PAIN
PAIN_FUNCTIONAL_ASSESSMENT: 0-10

## 2024-10-30 NOTE — ED PROVIDER NOTES
[facility]  ED  EMERGENCY DEPARTMENT ENCOUNTER        Pt Name: Nena Colunga  MRN: [id number]  Birthdate 1995  Date of evaluation: 10/30/2024  Provider: DELIO Hendrickson CNP  PCP: No primary care provider on file.  Note Started: 3:09 PM EDT 10/30/24      KIA. I have evaluated this patient.        CHIEF COMPLAINT       Chief Complaint   Patient presents with    Vomiting     Pt reporting she's been vomiting \"green stuff\" since yesterday        HISTORY OF PRESENT ILLNESS: 1 or more Elements     History From: the patient  Limitations to history : None    Nena Colunga is a 29 y.o. female who presents to the ER today with complaints of nausea and vomiting, patient states that she ate some fast food yesterday and then developed nausea and vomiting.  Patient states that she has had some abdominal cramping associated with it as well.  Denies any fevers.  Denies chest pain or difficulty breathing.  She is here for further evaluation.    Nursing Notes were all reviewed and agreed with or any disagreements were addressed in the HPI.    REVIEW OF SYSTEMS :      Review of Systems    Positives and Pertinent negatives as per HPI.     SURGICAL HISTORY     Past Surgical History:   Procedure Laterality Date    ANKLE ARTHROSCOPY  3/7/2011    debridement/excision fibular avulsion fracture/repair lateral ligament    ANKLE ARTHROSCOPY  10/24/2011    left    SHOULDER SURGERY      TONSILLECTOMY         CURRENTMEDICATIONS       Discharge Medication List as of 10/30/2024 10:45 AM        CONTINUE these medications which have NOT CHANGED    Details   venlafaxine (EFFEXOR XR) 75 MG extended release capsule Take 1 capsule by mouth daily, Disp-30 capsule, R-2Normal      ondansetron (ZOFRAN) 4 MG tablet Take 1 tablet by mouth every 8 hours as needed for Nausea, Disp-20 tablet, R-0Print      XULANE 150-35 MCG/24HR APPLY 1 PATCH PER WK NO PATCH WK 4 FOR CYCLE, DAWHistorical Med             ALLERGIES

## 2024-10-31 ENCOUNTER — HOSPITAL ENCOUNTER (EMERGENCY)
Age: 29
Discharge: HOME | End: 2024-10-31
Payer: COMMERCIAL

## 2024-10-31 VITALS
HEART RATE: 81 BPM | RESPIRATION RATE: 20 BRPM | HEIGHT: 65 IN | WEIGHT: 143.2 LBS | TEMPERATURE: 98.4 F | DIASTOLIC BLOOD PRESSURE: 80 MMHG | OXYGEN SATURATION: 95 % | BODY MASS INDEX: 23.86 KG/M2 | SYSTOLIC BLOOD PRESSURE: 132 MMHG

## 2024-10-31 DIAGNOSIS — R11.2 NAUSEA AND VOMITING, UNSPECIFIED VOMITING TYPE: Primary | ICD-10-CM

## 2024-10-31 LAB
ALBUMIN SERPL-MCNC: 3.9 G/DL (ref 3.4–5)
ALBUMIN/GLOB SERPL: 1.6 {RATIO} (ref 1.1–2.2)
ALP SERPL-CCNC: 49 U/L (ref 40–129)
ALT SERPL-CCNC: 6 U/L (ref 10–40)
ANION GAP SERPL CALCULATED.3IONS-SCNC: 15 MMOL/L (ref 3–16)
AST SERPL-CCNC: 18 U/L (ref 15–37)
BACTERIA URNS QL MICRO: ABNORMAL /HPF
BASOPHILS # BLD: 0 K/UL (ref 0–0.2)
BASOPHILS NFR BLD: 0.2 %
BILIRUB SERPL-MCNC: 0.4 MG/DL (ref 0–1)
BILIRUB UR QL STRIP.AUTO: ABNORMAL
BUN SERPL-MCNC: 17 MG/DL (ref 7–20)
CALCIUM SERPL-MCNC: 8.2 MG/DL (ref 8.3–10.6)
CHLORIDE SERPL-SCNC: 102 MMOL/L (ref 99–110)
CLARITY UR: CLEAR
CO2 SERPL-SCNC: 20 MMOL/L (ref 21–32)
COLOR UR: YELLOW
CREAT SERPL-MCNC: 0.8 MG/DL (ref 0.6–1.1)
DEPRECATED RDW RBC AUTO: 12.3 % (ref 12.4–15.4)
EOSINOPHIL # BLD: 0 K/UL (ref 0–0.6)
EOSINOPHIL NFR BLD: 0.4 %
EPI CELLS #/AREA URNS HPF: ABNORMAL /HPF (ref 0–5)
GFR SERPLBLD CREATININE-BSD FMLA CKD-EPI: >90 ML/MIN/{1.73_M2}
GLUCOSE SERPL-MCNC: 80 MG/DL (ref 70–99)
GLUCOSE UR STRIP.AUTO-MCNC: NEGATIVE MG/DL
HCG SERPL QL: NEGATIVE
HCT VFR BLD AUTO: 44.4 % (ref 36–48)
HGB BLD-MCNC: 15.1 G/DL (ref 12–16)
HGB UR QL STRIP.AUTO: ABNORMAL
KETONES UR STRIP.AUTO-MCNC: >=80 MG/DL
LEUKOCYTE ESTERASE UR QL STRIP.AUTO: NEGATIVE
LIPASE SERPL-CCNC: 64 U/L (ref 13–60)
LYMPHOCYTES # BLD: 1.1 K/UL (ref 1–5.1)
LYMPHOCYTES NFR BLD: 14.2 %
MAGNESIUM SERPL-MCNC: 1.9 MG/DL (ref 1.8–2.4)
MCH RBC QN AUTO: 31.9 PG (ref 26–34)
MCHC RBC AUTO-ENTMCNC: 34.1 G/DL (ref 31–36)
MCV RBC AUTO: 93.5 FL (ref 80–100)
MONOCYTES # BLD: 0.8 K/UL (ref 0–1.3)
MONOCYTES NFR BLD: 10.3 %
MUCOUS THREADS #/AREA URNS LPF: ABNORMAL /LPF
NEUTROPHILS # BLD: 6 K/UL (ref 1.7–7.7)
NEUTROPHILS NFR BLD: 74.9 %
NITRITE UR QL STRIP.AUTO: NEGATIVE
PH UR STRIP.AUTO: 5.5 [PH] (ref 5–8)
PLATELET # BLD AUTO: 320 K/UL (ref 135–450)
PMV BLD AUTO: 8.4 FL (ref 5–10.5)
POTASSIUM SERPL-SCNC: 3.9 MMOL/L (ref 3.5–5.1)
PROT SERPL-MCNC: 6.4 G/DL (ref 6.4–8.2)
PROT UR STRIP.AUTO-MCNC: NEGATIVE MG/DL
RBC # BLD AUTO: 4.74 M/UL (ref 4–5.2)
RBC #/AREA URNS HPF: ABNORMAL /HPF (ref 0–4)
REASON FOR REJECTION: NORMAL
REJECTED TEST: NORMAL
SODIUM SERPL-SCNC: 137 MMOL/L (ref 136–145)
SP GR UR STRIP.AUTO: >=1.03 (ref 1–1.03)
UA COMPLETE W REFLEX CULTURE PNL UR: ABNORMAL
UA DIPSTICK W REFLEX MICRO PNL UR: YES
URN SPEC COLLECT METH UR: ABNORMAL
UROBILINOGEN UR STRIP-ACNC: 0.2 E.U./DL
WBC # BLD AUTO: 8 K/UL (ref 4–11)
WBC #/AREA URNS HPF: ABNORMAL /HPF (ref 0–5)

## 2024-10-31 PROCEDURE — 96374 THER/PROPH/DIAG INJ IV PUSH: CPT

## 2024-10-31 PROCEDURE — 2580000003 HC RX 258: Performed by: PHYSICIAN ASSISTANT

## 2024-10-31 PROCEDURE — 80053 COMPREHEN METABOLIC PANEL: CPT

## 2024-10-31 PROCEDURE — 6360000002 HC RX W HCPCS: Performed by: PHYSICIAN ASSISTANT

## 2024-10-31 PROCEDURE — 36415 COLL VENOUS BLD VENIPUNCTURE: CPT

## 2024-10-31 PROCEDURE — 99283 EMERGENCY DEPT VISIT LOW MDM: CPT

## 2024-10-31 PROCEDURE — 85025 COMPLETE CBC W/AUTO DIFF WBC: CPT

## 2024-10-31 PROCEDURE — 81001 URINALYSIS AUTO W/SCOPE: CPT

## 2024-10-31 PROCEDURE — 84703 CHORIONIC GONADOTROPIN ASSAY: CPT

## 2024-10-31 PROCEDURE — 83735 ASSAY OF MAGNESIUM: CPT

## 2024-10-31 PROCEDURE — 83690 ASSAY OF LIPASE: CPT

## 2024-10-31 RX ORDER — PROMETHAZINE HYDROCHLORIDE 25 MG/1
25 TABLET ORAL 3 TIMES DAILY PRN
Qty: 12 TABLET | Refills: 0 | Status: SHIPPED | OUTPATIENT
Start: 2024-10-31 | End: 2024-11-07

## 2024-10-31 RX ORDER — ONDANSETRON 2 MG/ML
4 INJECTION INTRAMUSCULAR; INTRAVENOUS ONCE
Status: COMPLETED | OUTPATIENT
Start: 2024-10-31 | End: 2024-10-31

## 2024-10-31 RX ORDER — PROMETHAZINE HYDROCHLORIDE 25 MG/1
25 SUPPOSITORY RECTAL EVERY 6 HOURS PRN
Qty: 4 SUPPOSITORY | Refills: 0 | Status: SHIPPED | OUTPATIENT
Start: 2024-10-31 | End: 2024-11-07

## 2024-10-31 RX ORDER — 0.9 % SODIUM CHLORIDE 0.9 %
1000 INTRAVENOUS SOLUTION INTRAVENOUS ONCE
Status: COMPLETED | OUTPATIENT
Start: 2024-10-31 | End: 2024-10-31

## 2024-10-31 RX ADMIN — ONDANSETRON 4 MG: 2 INJECTION INTRAMUSCULAR; INTRAVENOUS at 13:12

## 2024-10-31 RX ADMIN — SODIUM CHLORIDE 1000 ML: 9 INJECTION, SOLUTION INTRAVENOUS at 13:12

## 2024-10-31 NOTE — ED PROVIDER NOTES
[facility]  ED  EMERGENCY DEPARTMENT ENCOUNTER        Pt Name: Nena Colunga  MRN: [id number]  Birthdate 1995  Date of evaluation: 10/31/2024  Provider: GURJIT Trejo  PCP: Deborah Garces APRN - CNP  Note Started: 3:31 PM EDT 10/31/24      KIA. I have evaluated this patient.        CHIEF COMPLAINT       Chief Complaint   Patient presents with    Vomiting     Throwing up for the past few days, was seen in ED yesterday.        HISTORY OF PRESENT ILLNESS: 1 or more Elements     History from : Patient    Limitations to history : None    Nena Colunga is a 29 y.o. female who presents to the emergency department complaining of nausea and vomiting.  Patient states she was seen in the emergency department yesterday for the same complaint.  She was given Zofran initially and did have good relief of symptoms however upon returning home she continued to have nausea and vomiting.  She states she has been unable to tolerate anything p.o. since leaving the hospital yesterday.  She states she has a friend who is a nurse who recommended she come back in the emergency department for evaluation.  She denies any abdominal pain.  No fevers or chills.  Denies any diarrhea.    Nursing Notes were all reviewed and agreed with or any disagreements were addressed in the HPI.    REVIEW OF SYSTEMS :      Review of Systems    Positives and Pertinent negatives as per HPI.     SURGICAL HISTORY     Past Surgical History:   Procedure Laterality Date    ANKLE ARTHROSCOPY  3/7/2011    debridement/excision fibular avulsion fracture/repair lateral ligament    ANKLE ARTHROSCOPY  10/24/2011    left    SHOULDER SURGERY      TONSILLECTOMY         CURRENTMEDICATIONS       Discharge Medication List as of 10/31/2024  3:35 PM        CONTINUE these medications which have NOT CHANGED    Details   ondansetron (ZOFRAN-ODT) 4 MG disintegrating tablet Take 1 tablet by mouth 3 times daily as needed for Nausea or

## 2024-10-31 NOTE — ED NOTES
PO challenge successful. Pt reporting \"I feel much better\" Denies any feelings of nausea at this time.

## 2024-11-18 ENCOUNTER — OFFICE VISIT (OUTPATIENT)
Dept: PSYCHOLOGY | Age: 29
End: 2024-11-18
Payer: COMMERCIAL

## 2024-11-18 DIAGNOSIS — F41.1 GAD (GENERALIZED ANXIETY DISORDER): Primary | ICD-10-CM

## 2024-11-18 PROCEDURE — 1036F TOBACCO NON-USER: CPT | Performed by: PSYCHOLOGIST

## 2024-11-18 PROCEDURE — 90791 PSYCH DIAGNOSTIC EVALUATION: CPT | Performed by: PSYCHOLOGIST

## 2024-11-18 ASSESSMENT — PATIENT HEALTH QUESTIONNAIRE - PHQ9
SUM OF ALL RESPONSES TO PHQ9 QUESTIONS 1 & 2: 2
SUM OF ALL RESPONSES TO PHQ QUESTIONS 1-9: 2
SUM OF ALL RESPONSES TO PHQ QUESTIONS 1-9: 2
2. FEELING DOWN, DEPRESSED OR HOPELESS: SEVERAL DAYS
SUM OF ALL RESPONSES TO PHQ QUESTIONS 1-9: 2
SUM OF ALL RESPONSES TO PHQ QUESTIONS 1-9: 2
1. LITTLE INTEREST OR PLEASURE IN DOING THINGS: SEVERAL DAYS

## 2024-11-18 ASSESSMENT — ANXIETY QUESTIONNAIRES
2. NOT BEING ABLE TO STOP OR CONTROL WORRYING: 3-NEARLY EVERY DAY
3. WORRYING TOO MUCH ABOUT DIFFERENT THINGS: 3-NEARLY EVERY DAY
GAD7 TOTAL SCORE: 17
1. FEELING NERVOUS, ANXIOUS, OR ON EDGE: NEARLY EVERY DAY
7. FEELING AFRAID AS IF SOMETHING AWFUL MIGHT HAPPEN: 1-SEVERAL DAYS
4. TROUBLE RELAXING: 3-NEARLY EVERY DAY
5. BEING SO RESTLESS THAT IT IS HARD TO SIT STILL: 2-OVER HALF THE DAYS
6. BECOMING EASILY ANNOYED OR IRRITABLE: 2-OVER HALF THE DAYS

## 2024-11-18 NOTE — PATIENT INSTRUCTIONS
Review handouts about anxiety.  Return to see Dr. Aguilar in 2 weeks.     The Physiology of Anxiety    When you sense danger, your brain activates your autonomic nervous system.  The two branches of your autonomic nervous system, the sympathetic and the parasympathetic, control your body's energy level in order to prepare you for action.  The sympathetic nervous system controls your fight or flight response and releases energy to prepare you for action.  The parasympathetic nervous system is your body’s relaxation/recovery system:  it returns your body to a normal state when the danger is over.    The sympathetic nervous system is an all-or-none system.  That means that when it’s activated it quickly turns on all of its component parts (which is a great way for an emergency response system to operate):    Rapid Heart Rate, Rapid Breathing:  The alarm reaction increases the heart rate and breathing rate so that we are alert and our muscles are ready for action.  These changes also help insure that the muscles and brain will have enough oxygen and energy for defense.  At the same time, blood flow to the skin decreases, which prevents us from losing as much blood if we are wounded.    Sweating:  Sweating helps to cool the body during exertion, making it more efficient.  “Cold sweat” is what some people feel when sweating occurs at the same time that blood flow to the skin decreases.    Tight Chest, Tingling, Numbness, Hot Flushes, Trembling:  Hyperventilation occurs when we breathe rapidly but do not expend the energy with muscle action, like revving a car while holding down the brake.  This can lead to feelings of tingling and numbness, hot flushes, and increased sweating.  When rapid chest breathing and muscle tension occur at the same time, people feel chest pain, breathlessness, and choking.      Upset Stomach, Diarrhea:  Digestion isn’t needed during times of danger, and the sympathetic nervous system shuts it down,
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
## 2024-11-20 SDOH — HEALTH STABILITY: PHYSICAL HEALTH: ON AVERAGE, HOW MANY DAYS PER WEEK DO YOU ENGAGE IN MODERATE TO STRENUOUS EXERCISE (LIKE A BRISK WALK)?: 0 DAYS

## 2024-11-21 ENCOUNTER — OFFICE VISIT (OUTPATIENT)
Dept: FAMILY MEDICINE CLINIC | Age: 29
End: 2024-11-21
Payer: COMMERCIAL

## 2024-11-21 ENCOUNTER — TELEPHONE (OUTPATIENT)
Dept: FAMILY MEDICINE CLINIC | Age: 29
End: 2024-11-21

## 2024-11-21 VITALS
BODY MASS INDEX: 24.83 KG/M2 | WEIGHT: 149 LBS | HEART RATE: 84 BPM | SYSTOLIC BLOOD PRESSURE: 124 MMHG | DIASTOLIC BLOOD PRESSURE: 80 MMHG | OXYGEN SATURATION: 98 % | HEIGHT: 65 IN

## 2024-11-21 DIAGNOSIS — F41.9 ANXIETY AND DEPRESSION: ICD-10-CM

## 2024-11-21 DIAGNOSIS — F32.A ANXIETY AND DEPRESSION: ICD-10-CM

## 2024-11-21 DIAGNOSIS — Z76.89 ENCOUNTER TO ESTABLISH CARE: Primary | ICD-10-CM

## 2024-11-21 PROCEDURE — G8427 DOCREV CUR MEDS BY ELIG CLIN: HCPCS | Performed by: NURSE PRACTITIONER

## 2024-11-21 PROCEDURE — 1036F TOBACCO NON-USER: CPT | Performed by: NURSE PRACTITIONER

## 2024-11-21 PROCEDURE — G8420 CALC BMI NORM PARAMETERS: HCPCS | Performed by: NURSE PRACTITIONER

## 2024-11-21 PROCEDURE — 99213 OFFICE O/P EST LOW 20 MIN: CPT | Performed by: NURSE PRACTITIONER

## 2024-11-21 PROCEDURE — G8484 FLU IMMUNIZE NO ADMIN: HCPCS | Performed by: NURSE PRACTITIONER

## 2024-11-21 RX ORDER — VENLAFAXINE HYDROCHLORIDE 150 MG/1
150 CAPSULE, EXTENDED RELEASE ORAL DAILY
Qty: 30 CAPSULE | Refills: 3 | Status: SHIPPED | OUTPATIENT
Start: 2024-11-21

## 2024-11-21 ASSESSMENT — ENCOUNTER SYMPTOMS
DIARRHEA: 0
NAUSEA: 0
COUGH: 0
BLOOD IN STOOL: 0
SHORTNESS OF BREATH: 0
VOMITING: 0
WHEEZING: 0

## 2024-11-21 ASSESSMENT — PATIENT HEALTH QUESTIONNAIRE - PHQ9
SUM OF ALL RESPONSES TO PHQ QUESTIONS 1-9: 11
9. THOUGHTS THAT YOU WOULD BE BETTER OFF DEAD, OR OF HURTING YOURSELF: NOT AT ALL
1. LITTLE INTEREST OR PLEASURE IN DOING THINGS: MORE THAN HALF THE DAYS
2. FEELING DOWN, DEPRESSED OR HOPELESS: MORE THAN HALF THE DAYS
10. IF YOU CHECKED OFF ANY PROBLEMS, HOW DIFFICULT HAVE THESE PROBLEMS MADE IT FOR YOU TO DO YOUR WORK, TAKE CARE OF THINGS AT HOME, OR GET ALONG WITH OTHER PEOPLE: SOMEWHAT DIFFICULT
3. TROUBLE FALLING OR STAYING ASLEEP: SEVERAL DAYS
4. FEELING TIRED OR HAVING LITTLE ENERGY: NEARLY EVERY DAY
SUM OF ALL RESPONSES TO PHQ9 QUESTIONS 1 & 2: 4
SUM OF ALL RESPONSES TO PHQ QUESTIONS 1-9: 11
5. POOR APPETITE OR OVEREATING: NOT AT ALL
SUM OF ALL RESPONSES TO PHQ QUESTIONS 1-9: 11
SUM OF ALL RESPONSES TO PHQ QUESTIONS 1-9: 11
6. FEELING BAD ABOUT YOURSELF - OR THAT YOU ARE A FAILURE OR HAVE LET YOURSELF OR YOUR FAMILY DOWN: NOT AT ALL
8. MOVING OR SPEAKING SO SLOWLY THAT OTHER PEOPLE COULD HAVE NOTICED. OR THE OPPOSITE, BEING SO FIGETY OR RESTLESS THAT YOU HAVE BEEN MOVING AROUND A LOT MORE THAN USUAL: NOT AT ALL
7. TROUBLE CONCENTRATING ON THINGS, SUCH AS READING THE NEWSPAPER OR WATCHING TELEVISION: NEARLY EVERY DAY

## 2024-11-21 ASSESSMENT — ANXIETY QUESTIONNAIRES
7. FEELING AFRAID AS IF SOMETHING AWFUL MIGHT HAPPEN: NOT AT ALL
3. WORRYING TOO MUCH ABOUT DIFFERENT THINGS: NEARLY EVERY DAY
IF YOU CHECKED OFF ANY PROBLEMS ON THIS QUESTIONNAIRE, HOW DIFFICULT HAVE THESE PROBLEMS MADE IT FOR YOU TO DO YOUR WORK, TAKE CARE OF THINGS AT HOME, OR GET ALONG WITH OTHER PEOPLE: SOMEWHAT DIFFICULT
4. TROUBLE RELAXING: NEARLY EVERY DAY
6. BECOMING EASILY ANNOYED OR IRRITABLE: MORE THAN HALF THE DAYS
5. BEING SO RESTLESS THAT IT IS HARD TO SIT STILL: SEVERAL DAYS
GAD7 TOTAL SCORE: 15
2. NOT BEING ABLE TO STOP OR CONTROL WORRYING: NEARLY EVERY DAY
1. FEELING NERVOUS, ANXIOUS, OR ON EDGE: NEARLY EVERY DAY

## 2024-11-21 NOTE — PROGRESS NOTES
PROGRESS NOTE  Date of Service:  11/21/2024    SUBJECTIVE:  Patient ID: Nena Colunga is a 29 y.o. female    HPI: new patient exam former pt of ABHIJEET Braga her to establish care  Meds allergies and disease reviewed  Phq and gab noted increased       History reviewed. No pertinent past medical history.   Past Surgical History:   Procedure Laterality Date    ANKLE ARTHROSCOPY  3/7/2011    debridement/excision fibular avulsion fracture/repair lateral ligament    ANKLE ARTHROSCOPY  10/24/2011    left    SHOULDER SURGERY      TONSILLECTOMY        Social History     Tobacco Use    Smoking status: Never    Smokeless tobacco: Never   Substance Use Topics    Alcohol use: Yes     Alcohol/week: 3.0 standard drinks of alcohol     Types: 3 Shots of liquor per week     Comment: \"3 or 5 drinks on the weeknds\"      Family History   Problem Relation Age of Onset    Diabetes Paternal Grandmother      Current Outpatient Medications on File Prior to Visit   Medication Sig Dispense Refill    ondansetron (ZOFRAN-ODT) 4 MG disintegrating tablet Take 1 tablet by mouth 3 times daily as needed for Nausea or Vomiting 21 tablet 0    ondansetron (ZOFRAN) 4 MG tablet Take 1 tablet by mouth every 8 hours as needed for Nausea 20 tablet 0    XULANE 150-35 MCG/24HR APPLY 1 PATCH PER WK NO PATCH WK 4 FOR CYCLE       No current facility-administered medications on file prior to visit.       Allergies   Allergen Reactions    Amoxicillin      rash    Prednisone Hives    Morphine And Codeine Other (See Comments) and Nausea And Vomiting     Unknown reaction      Vicodin [Hydrocodone-Acetaminophen] Nausea And Vomiting        Review of Systems   Constitutional:  Negative for unexpected weight change.   HENT:  Negative for congestion.    Respiratory:  Negative for cough, shortness of breath and wheezing.    Cardiovascular:  Negative for chest pain.   Gastrointestinal:  Negative for blood in stool, diarrhea, nausea and vomiting.   Musculoskeletal:

## 2024-11-21 NOTE — TELEPHONE ENCOUNTER
Patient just called back and stated she called the place you told her too and they are not taking new patients until next year (January) and she can't wait that long

## 2024-11-22 DIAGNOSIS — F90.9 ENCOUNTER FOR MEDICATION MANAGEMENT IN ATTENTION DEFICIT HYPERACTIVITY DISORDER (ADHD): ICD-10-CM

## 2024-11-22 DIAGNOSIS — F51.04 PSYCHOPHYSIOLOGICAL INSOMNIA: ICD-10-CM

## 2024-11-22 DIAGNOSIS — Z79.899 ENCOUNTER FOR MEDICATION MANAGEMENT IN ATTENTION DEFICIT HYPERACTIVITY DISORDER (ADHD): ICD-10-CM

## 2024-11-22 DIAGNOSIS — F41.9 ANXIETY AND DEPRESSION: Primary | ICD-10-CM

## 2024-11-22 DIAGNOSIS — F32.A ANXIETY AND DEPRESSION: Primary | ICD-10-CM

## 2024-12-09 ENCOUNTER — TELEMEDICINE (OUTPATIENT)
Age: 29
End: 2024-12-09
Payer: COMMERCIAL

## 2024-12-09 ENCOUNTER — TELEPHONE (OUTPATIENT)
Dept: INTERNAL MEDICINE CLINIC | Age: 29
End: 2024-12-09

## 2024-12-09 ENCOUNTER — APPOINTMENT (OUTPATIENT)
Dept: GENERAL RADIOLOGY | Age: 29
End: 2024-12-09
Payer: COMMERCIAL

## 2024-12-09 ENCOUNTER — HOSPITAL ENCOUNTER (EMERGENCY)
Age: 29
Discharge: HOME | End: 2024-12-09
Attending: EMERGENCY MEDICINE
Payer: COMMERCIAL

## 2024-12-09 VITALS
SYSTOLIC BLOOD PRESSURE: 158 MMHG | RESPIRATION RATE: 20 BRPM | HEART RATE: 86 BPM | OXYGEN SATURATION: 99 % | TEMPERATURE: 98.4 F | DIASTOLIC BLOOD PRESSURE: 93 MMHG

## 2024-12-09 DIAGNOSIS — R03.0 ELEVATED BLOOD PRESSURE READING: ICD-10-CM

## 2024-12-09 DIAGNOSIS — F32.A DEPRESSION, UNSPECIFIED DEPRESSION TYPE: ICD-10-CM

## 2024-12-09 DIAGNOSIS — F32.A ANXIETY AND DEPRESSION: Primary | ICD-10-CM

## 2024-12-09 DIAGNOSIS — F41.9 ANXIETY AND DEPRESSION: Primary | ICD-10-CM

## 2024-12-09 DIAGNOSIS — Z76.89 ENCOUNTER FOR PSYCHIATRIC ASSESSMENT: Primary | ICD-10-CM

## 2024-12-09 LAB
ALBUMIN SERPL-MCNC: 4.3 G/DL (ref 3.4–5)
ALBUMIN/GLOB SERPL: 1.5 {RATIO} (ref 1.1–2.2)
ALP SERPL-CCNC: 67 U/L (ref 40–129)
ALT SERPL-CCNC: 11 U/L (ref 10–40)
AMPHETAMINES UR QL SCN>1000 NG/ML: ABNORMAL
ANION GAP SERPL CALCULATED.3IONS-SCNC: 12 MMOL/L (ref 3–16)
APAP SERPL-MCNC: <5 UG/ML (ref 10–30)
AST SERPL-CCNC: 20 U/L (ref 15–37)
BACTERIA URNS QL MICRO: ABNORMAL /HPF
BARBITURATES UR QL SCN>200 NG/ML: ABNORMAL
BASOPHILS # BLD: 0 K/UL (ref 0–0.2)
BASOPHILS NFR BLD: 0.4 %
BENZODIAZ UR QL SCN>200 NG/ML: ABNORMAL
BILIRUB SERPL-MCNC: 0.5 MG/DL (ref 0–1)
BILIRUB UR QL STRIP.AUTO: ABNORMAL
BUN SERPL-MCNC: 15 MG/DL (ref 7–20)
CALCIUM SERPL-MCNC: 8.6 MG/DL (ref 8.3–10.6)
CANNABINOIDS UR QL SCN>50 NG/ML: POSITIVE
CHLORIDE SERPL-SCNC: 100 MMOL/L (ref 99–110)
CLARITY UR: CLEAR
CO2 SERPL-SCNC: 23 MMOL/L (ref 21–32)
COCAINE UR QL SCN: ABNORMAL
COLOR UR: YELLOW
CREAT SERPL-MCNC: 0.6 MG/DL (ref 0.6–1.1)
DEPRECATED RDW RBC AUTO: 12.7 % (ref 12.4–15.4)
DRUG SCREEN COMMENT UR-IMP: ABNORMAL
EKG ATRIAL RATE: 80 BPM
EKG DIAGNOSIS: NORMAL
EKG P AXIS: 59 DEGREES
EKG P-R INTERVAL: 132 MS
EKG Q-T INTERVAL: 378 MS
EKG QRS DURATION: 76 MS
EKG QTC CALCULATION (BAZETT): 435 MS
EKG R AXIS: 61 DEGREES
EKG T AXIS: 34 DEGREES
EKG VENTRICULAR RATE: 80 BPM
EOSINOPHIL # BLD: 0 K/UL (ref 0–0.6)
EOSINOPHIL NFR BLD: 0 %
EPI CELLS #/AREA URNS HPF: ABNORMAL /HPF (ref 0–5)
ETHANOLAMINE SERPL-MCNC: NORMAL MG/DL (ref 0–0.08)
FENTANYL SCREEN, URINE: ABNORMAL
GFR SERPLBLD CREATININE-BSD FMLA CKD-EPI: >90 ML/MIN/{1.73_M2}
GLUCOSE SERPL-MCNC: 89 MG/DL (ref 70–99)
GLUCOSE UR STRIP.AUTO-MCNC: NEGATIVE MG/DL
HCG UR QL: NEGATIVE
HCT VFR BLD AUTO: 43.7 % (ref 36–48)
HGB BLD-MCNC: 14.8 G/DL (ref 12–16)
HGB UR QL STRIP.AUTO: NEGATIVE
HYALINE CASTS #/AREA URNS LPF: ABNORMAL /LPF (ref 0–2)
KETONES UR STRIP.AUTO-MCNC: ABNORMAL MG/DL
LEUKOCYTE ESTERASE UR QL STRIP.AUTO: NEGATIVE
LYMPHOCYTES # BLD: 1.4 K/UL (ref 1–5.1)
LYMPHOCYTES NFR BLD: 19.6 %
MCH RBC QN AUTO: 31.5 PG (ref 26–34)
MCHC RBC AUTO-ENTMCNC: 33.9 G/DL (ref 31–36)
MCV RBC AUTO: 92.8 FL (ref 80–100)
METHADONE UR QL SCN>300 NG/ML: ABNORMAL
MONOCYTES # BLD: 0.6 K/UL (ref 0–1.3)
MONOCYTES NFR BLD: 8.1 %
MUCOUS THREADS #/AREA URNS LPF: ABNORMAL /LPF
NEUTROPHILS # BLD: 5.3 K/UL (ref 1.7–7.7)
NEUTROPHILS NFR BLD: 71.9 %
NITRITE UR QL STRIP.AUTO: NEGATIVE
OPIATES UR QL SCN>300 NG/ML: ABNORMAL
OXYCODONE UR QL SCN: ABNORMAL
PCP UR QL SCN>25 NG/ML: ABNORMAL
PH UR STRIP.AUTO: 6 [PH] (ref 5–8)
PH UR STRIP: 6 [PH]
PLATELET # BLD AUTO: 303 K/UL (ref 135–450)
PMV BLD AUTO: 8.2 FL (ref 5–10.5)
POTASSIUM SERPL-SCNC: 3.6 MMOL/L (ref 3.5–5.1)
PROT SERPL-MCNC: 7.1 G/DL (ref 6.4–8.2)
PROT UR STRIP.AUTO-MCNC: ABNORMAL MG/DL
RBC # BLD AUTO: 4.71 M/UL (ref 4–5.2)
RBC #/AREA URNS HPF: ABNORMAL /HPF (ref 0–4)
REASON FOR REJECTION: NORMAL
REJECTED TEST: NORMAL
SALICYLATES SERPL-MCNC: 0.5 MG/DL (ref 15–30)
SODIUM SERPL-SCNC: 135 MMOL/L (ref 136–145)
SP GR UR STRIP.AUTO: >=1.03 (ref 1–1.03)
TROPONIN, HIGH SENSITIVITY: <6 NG/L (ref 0–14)
TROPONIN, HIGH SENSITIVITY: <6 NG/L (ref 0–14)
UA COMPLETE W REFLEX CULTURE PNL UR: ABNORMAL
UA DIPSTICK W REFLEX MICRO PNL UR: YES
URN SPEC COLLECT METH UR: ABNORMAL
UROBILINOGEN UR STRIP-ACNC: 0.2 E.U./DL
WBC # BLD AUTO: 7.4 K/UL (ref 4–11)
WBC #/AREA URNS HPF: ABNORMAL /HPF (ref 0–5)

## 2024-12-09 PROCEDURE — 80307 DRUG TEST PRSMV CHEM ANLYZR: CPT

## 2024-12-09 PROCEDURE — 80179 DRUG ASSAY SALICYLATE: CPT

## 2024-12-09 PROCEDURE — 93010 ELECTROCARDIOGRAM REPORT: CPT | Performed by: INTERNAL MEDICINE

## 2024-12-09 PROCEDURE — 85025 COMPLETE CBC W/AUTO DIFF WBC: CPT

## 2024-12-09 PROCEDURE — 99213 OFFICE O/P EST LOW 20 MIN: CPT | Performed by: NURSE PRACTITIONER

## 2024-12-09 PROCEDURE — G8484 FLU IMMUNIZE NO ADMIN: HCPCS | Performed by: NURSE PRACTITIONER

## 2024-12-09 PROCEDURE — 84484 ASSAY OF TROPONIN QUANT: CPT

## 2024-12-09 PROCEDURE — G8420 CALC BMI NORM PARAMETERS: HCPCS | Performed by: NURSE PRACTITIONER

## 2024-12-09 PROCEDURE — G8427 DOCREV CUR MEDS BY ELIG CLIN: HCPCS | Performed by: NURSE PRACTITIONER

## 2024-12-09 PROCEDURE — 1036F TOBACCO NON-USER: CPT | Performed by: NURSE PRACTITIONER

## 2024-12-09 PROCEDURE — 80143 DRUG ASSAY ACETAMINOPHEN: CPT

## 2024-12-09 PROCEDURE — 93005 ELECTROCARDIOGRAM TRACING: CPT | Performed by: REGISTERED NURSE

## 2024-12-09 PROCEDURE — 90792 PSYCH DIAG EVAL W/MED SRVCS: CPT | Performed by: NURSE PRACTITIONER

## 2024-12-09 PROCEDURE — 99285 EMERGENCY DEPT VISIT HI MDM: CPT

## 2024-12-09 PROCEDURE — 36415 COLL VENOUS BLD VENIPUNCTURE: CPT

## 2024-12-09 PROCEDURE — 80053 COMPREHEN METABOLIC PANEL: CPT

## 2024-12-09 PROCEDURE — 82077 ASSAY SPEC XCP UR&BREATH IA: CPT

## 2024-12-09 PROCEDURE — 71046 X-RAY EXAM CHEST 2 VIEWS: CPT

## 2024-12-09 PROCEDURE — 84703 CHORIONIC GONADOTROPIN ASSAY: CPT

## 2024-12-09 PROCEDURE — 81001 URINALYSIS AUTO W/SCOPE: CPT

## 2024-12-09 RX ORDER — BUSPIRONE HYDROCHLORIDE 5 MG/1
5 TABLET ORAL 2 TIMES DAILY
Qty: 60 TABLET | Refills: 0 | Status: SHIPPED | OUTPATIENT
Start: 2024-12-09 | End: 2024-12-12

## 2024-12-09 ASSESSMENT — ENCOUNTER SYMPTOMS
ABDOMINAL PAIN: 0
SHORTNESS OF BREATH: 0
CHEST TIGHTNESS: 0

## 2024-12-09 NOTE — ED NOTES
Patient presented to ED via Private Vehicle for evaluation. Explained process of securing patient belongings for patient/staff safety, patient verbalized understanding. Security at bedside, metal detector wanding completed. Patient changed into safety gown. All belongings itemized by  Rigoberto, placed in labeled bag, removed from the patient care area, and taken to the security locker. Itemized list placed with belongings, in patient record, and a copy given to the patient.

## 2024-12-09 NOTE — ASSESSMENT & PLAN NOTE
situation, such as eating in front of others or speaking in public. You may worry a lot. Or you may be afraid that something bad will happen.  Anxiety that can cause you to blush, sweat, and feel shaky.  A heartbeat that is faster than normal.  A hard time focusing.  Phobias  Symptoms may include:  More fear than most people of being around an object, being in a situation, or doing an activity. You might also be stressed about the chance of being around the thing you fear.  Worry about losing control, panicking, fainting, or having physical symptoms like a faster heartbeat when you are around the situation or object.  How are these disorders treated?  Anxiety disorders can be treated with medicines or counseling. A combination of both may be used.  Medicines may include:  Antidepressants. These may help your symptoms by keeping chemicals in your brain in balance.  Benzodiazepines. These may give you short-term relief of your symptoms.  Some people use cognitive-behavioral therapy. A therapist helps you learn to change stressful or bad thoughts into helpful thoughts.  Lead a healthy lifestyle  A healthy lifestyle may help you feel better.  Get at least 30 minutes of exercise on most days of the week. Walking is a good choice.  Eat a healthy diet. Include fruits, vegetables, lean proteins, and whole grains in your diet each day.  Try to go to bed at the same time every night. Try for 8 hours of sleep a night.  Find ways to manage stress. Try relaxation exercises.  Avoid alcohol and illegal drugs.  Follow-up care is a key part of your treatment and safety. Be sure to make and go to all appointments, and call your doctor if you are having problems. It's also a good idea to know your test results and keep a list of the medicines you take.    Follow-up with PCP as [id number]  Call to get in sooner if needed    The patient would benefit from future follow up with their usual PCP. As of the end of their Virtualist

## 2024-12-09 NOTE — VIRTUAL HEALTH
you are not or unsure, please re-schedule the visit: Yes, I confirm.   [place] Consult to Tele-Psych  Consult performed by: Carole Dang APRN - CNP  Consult ordered by: Katrin Hartley APRN - CNP  Reason for consult: Suicidal/Risk to Self      Total time spent on this encounter: Not billed by time    --DELIO Duvall CNP on 12/9/2024 at 12:22 PM    An electronic signature was used to authenticate this note.

## 2024-12-09 NOTE — ED PROVIDER NOTES
[facility] ED  EMERGENCY DEPARTMENT ENCOUNTER        Pt Name: Nena Colunga  MRN: [id number]  Birthdate 1995  Date of evaluation: 12/9/2024  Provider: DELIO Ralph CNP  PCP: Deborah Garces APRN - CNP    This patient was seen and evaluated by the attending physician Holger Morales MD.    I have evaluated this patient. My supervising physician was available for consultation.      CHIEF COMPLAINT       Chief Complaint   Patient presents with    Psychiatric Evaluation     Patient states she was started on new medications for depression and had dose increased. States she feels like her episodes of depression are lasting longer. Patient has no interest in getting out of bed or doing anything. Patient denies SI/HI       HISTORY OF PRESENT ILLNESS   (Location/Symptom, Timing/Onset, Context/Setting, Quality, Duration, Modifying Factors, Severity)  Note limiting factors.     History from : Patient  Nena Colunga is a 29 y.o. female who presents via private car for psychiatric assessment.  Patient states that she struggles with depression, anxiety and panic attacks.  She states that she was started on Effexor in July and her dose was doubled in October.  She was tolerating it well and her symptoms were somewhat improved but she has had some increasing stress over the last several weeks.  3 weeks ago she was seen by her PCP and had her dose increased again.  She states it does not seem to be helping.  She is having feelings of hopelessness.  She has been sleeping more, not wanting to get out of bed and today she called off work.  She began on Friday having thoughts that \"it would just be better if I was not here anymore\".  Patient denies a plan for suicide but states that she is feeling very \"hopeless\" and she states that she knows that these feelings are abnormal and that she needs help.  She denies thoughts of wanting to harm others and she denies any delusions or

## 2024-12-09 NOTE — DISCHARGE INSTRUCTIONS
Psychotherapy, Psychoanalysis, Psychiatry Referrals    National Suicide Prevention Lifeline - 988 has been designated as the new three-digit dialing code that will route callers to the National Suicide Prevention Lifeline.    [facility] - behavioral health professionals are available 24 hours a day 7 days a week - [phone]    www.psychology"[employer]com - this is a website/search tool that allows you to search for behavioral health providers by insurance, zip code, and other factors. You can read profiles of therapists and, in some cases, message them directly through the website    [facility] - specializes in the evaluation and treatment of ADHD, they also provide comprehensive evaluation, consultation, therapy, and medication services to individuals who may have other mental health needs as well.   [employer]  Call: [phone]  [place]: [address], [address]    [facility] - was designed with you, the client, in mind. They give clients access to the areas most seasoned, clinically qualified therapists. They offer a diverse range of services that include psychiatric therapy, medication management, play therapy as well as social skills groups, anger management, substance abuse, family therapy, couples and school-based counseling, domestic violence treatment and a wide range of other mental health services.   Takes most insurances.  Call: [phone]  [place]: [address], [place] [address]    [facility] - if you're looking for extra support and guidance through a challenging situation or you're just ready to move in a new direction in your life, Dr. Sinclair will work with you to achieve your goals.   Does not take Medicaid.  Call: [phone]  [place]: Virtual visits only    [facility] - treatment for substance abuse/addiction and mental health disorders. Outpatient treatment program available for adults and children/adolescents.

## 2024-12-09 NOTE — ED PROVIDER NOTES
THIS IS MY KIA SUPERVISORY AND SHARED VISIT NOTE:    I personally saw the patient and made/approved the management plan and take responsibility for the patient management.    History: 29-year-old female presenting for evaluation of depression.  Patient reports that she has been having worsening depression.  She has had decreased interest in getting out of bed or doing anything.  No active suicidal ideation or homicidal ideation.  She had seen PCP through video visit who had prescribed BuSpar.    Exam: No acute distress no respiratory distress, denies suicidal ideation or homicidal ideation alert and oriented x 4    MDM: 29-year-old female presenting for evaluation of depression.  Patient was ultimately evaluated by psychiatry.  Both patient and psychiatry feel the patient is safe to go home at this time as she has no active suicide ideation or homicidal ideation.  She will be provided with outpatient resources, she will fill the prescription for BuSpar that was written by PCP office.  She was advised to return for any worsening symptoms        I personally saw the patient and independently provided 0 minutes of non-concurrent critical care out of the total shared critical care time provided.     EKG  The Ekg interpreted by myself in the emergency department in the absence of a cardiologist.  normal sinus rhythm with a rate of 80  Axis is   Normal  QTc is  within an acceptable range  Intervals and Durations are unremarkable.      No specific ST-T wave changes appreciated.  No evidence of acute ischemia.   No prior EKG available for comparison    No results found.      I, Dr. Morales, am the primary clinician of record.     Comment: Please note this report has been produced using speech recognition software and may contain errors related to that system including errors in grammar, punctuation, and spelling, as well as words and phrases that may be inappropriate. If there are any questions or concerns please feel free

## 2024-12-09 NOTE — PROGRESS NOTES
Nena Colunga, was evaluated through a synchronous (real-time) audio-video encounter. The patient (or guardian if applicable) is aware that this is a billable service, which includes applicable co-pays. This Virtual Visit was conducted with patient's (and/or legal guardian's) consent. Patient identification was verified, and a caregiver was present when appropriate.   The patient was located at Home: [address] [place] [address] [place] [address]  Provider was located at Home (Appt Dept State): [place]  Confirm you are appropriately licensed, registered, or certified to deliver care in the state where the patient is located as indicated above. If you are not or unsure, please re-schedule the visit: Yes, I confirm.     Nena Colunga (:  1995) is a Established patient, presenting virtually for evaluation of the following:    Effexor was increased and was having worsening symptoms/concerning thoughts. Denies SI. Has appointment today with  today at 3:30 as well.      Below is the assessment and plan developed based on review of pertinent history, physical exam, labs, studies, and medications.    Assessment & Plan  Anxiety and depression    Problem    Orders:    busPIRone (BUSPAR) 5 MG tablet; Take 1 tablet by mouth 2 times daily  She will continue the Effexor 150 mg tablet extended release daily  Has a follow-up with Dr. Aguilra today  Has a follow-up with her provider by the end of the month.  She states she will try to get this moved up    See patient instructions    Anxiety disorders are a type of medical problem. They cause severe anxiety. When you feel anxious, you feel that something bad is about to happen. This feeling interferes with your life.  These disorders include:  Generalized anxiety disorder. You feel worried and stressed about many everyday events and activities. This goes on for several months and disrupts your life on most days.  Panic disorder. You have repeated panic attacks. A panic attack is

## 2024-12-10 NOTE — TELEPHONE ENCOUNTER
Patient is interested in establishing care with Missael Samson.   Please call patient and confirm. I can see her on Thursday at 11AM, 12/12/24

## 2024-12-11 SDOH — HEALTH STABILITY: PHYSICAL HEALTH: ON AVERAGE, HOW MANY MINUTES DO YOU ENGAGE IN EXERCISE AT THIS LEVEL?: 30 MIN

## 2024-12-11 SDOH — HEALTH STABILITY: PHYSICAL HEALTH: ON AVERAGE, HOW MANY DAYS PER WEEK DO YOU ENGAGE IN MODERATE TO STRENUOUS EXERCISE (LIKE A BRISK WALK)?: 3 DAYS

## 2024-12-12 ENCOUNTER — OFFICE VISIT (OUTPATIENT)
Dept: INTERNAL MEDICINE CLINIC | Age: 29
End: 2024-12-12

## 2024-12-12 VITALS
SYSTOLIC BLOOD PRESSURE: 122 MMHG | HEIGHT: 64 IN | DIASTOLIC BLOOD PRESSURE: 82 MMHG | HEART RATE: 77 BPM | BODY MASS INDEX: 23.73 KG/M2 | WEIGHT: 139 LBS | OXYGEN SATURATION: 100 %

## 2024-12-12 DIAGNOSIS — F41.9 ANXIETY AND DEPRESSION: ICD-10-CM

## 2024-12-12 DIAGNOSIS — F32.A ANXIETY AND DEPRESSION: ICD-10-CM

## 2024-12-12 RX ORDER — VENLAFAXINE HYDROCHLORIDE 75 MG/1
150 CAPSULE, EXTENDED RELEASE ORAL DAILY
Qty: 30 CAPSULE | Refills: 1 | Status: SHIPPED | OUTPATIENT
Start: 2024-12-12

## 2024-12-12 RX ORDER — BUSPIRONE HYDROCHLORIDE 5 MG/1
5 TABLET ORAL 3 TIMES DAILY
Qty: 90 TABLET | Refills: 0 | Status: SHIPPED | OUTPATIENT
Start: 2024-12-12 | End: 2025-01-11

## 2024-12-12 ASSESSMENT — COLUMBIA-SUICIDE SEVERITY RATING SCALE - C-SSRS
1. WITHIN THE PAST MONTH, HAVE YOU WISHED YOU WERE DEAD OR WISHED YOU COULD GO TO SLEEP AND NOT WAKE UP?: YES
6. HAVE YOU EVER DONE ANYTHING, STARTED TO DO ANYTHING, OR PREPARED TO DO ANYTHING TO END YOUR LIFE?: NO
2. HAVE YOU ACTUALLY HAD ANY THOUGHTS OF KILLING YOURSELF?: NO

## 2024-12-12 ASSESSMENT — PATIENT HEALTH QUESTIONNAIRE - PHQ9
4. FEELING TIRED OR HAVING LITTLE ENERGY: NEARLY EVERY DAY
SUM OF ALL RESPONSES TO PHQ QUESTIONS 1-9: 11
10. IF YOU CHECKED OFF ANY PROBLEMS, HOW DIFFICULT HAVE THESE PROBLEMS MADE IT FOR YOU TO DO YOUR WORK, TAKE CARE OF THINGS AT HOME, OR GET ALONG WITH OTHER PEOPLE: VERY DIFFICULT
7. TROUBLE CONCENTRATING ON THINGS, SUCH AS READING THE NEWSPAPER OR WATCHING TELEVISION: NEARLY EVERY DAY
SUM OF ALL RESPONSES TO PHQ9 QUESTIONS 1 & 2: 3
3. TROUBLE FALLING OR STAYING ASLEEP: NOT AT ALL
9. THOUGHTS THAT YOU WOULD BE BETTER OFF DEAD, OR OF HURTING YOURSELF: SEVERAL DAYS
6. FEELING BAD ABOUT YOURSELF - OR THAT YOU ARE A FAILURE OR HAVE LET YOURSELF OR YOUR FAMILY DOWN: SEVERAL DAYS
2. FEELING DOWN, DEPRESSED OR HOPELESS: SEVERAL DAYS
8. MOVING OR SPEAKING SO SLOWLY THAT OTHER PEOPLE COULD HAVE NOTICED. OR THE OPPOSITE, BEING SO FIGETY OR RESTLESS THAT YOU HAVE BEEN MOVING AROUND A LOT MORE THAN USUAL: NOT AT ALL
SUM OF ALL RESPONSES TO PHQ QUESTIONS 1-9: 12
1. LITTLE INTEREST OR PLEASURE IN DOING THINGS: MORE THAN HALF THE DAYS
5. POOR APPETITE OR OVEREATING: SEVERAL DAYS
SUM OF ALL RESPONSES TO PHQ QUESTIONS 1-9: 12
SUM OF ALL RESPONSES TO PHQ QUESTIONS 1-9: 12

## 2024-12-12 ASSESSMENT — ANXIETY QUESTIONNAIRES
5. BEING SO RESTLESS THAT IT IS HARD TO SIT STILL: MORE THAN HALF THE DAYS
1. FEELING NERVOUS, ANXIOUS, OR ON EDGE: NEARLY EVERY DAY
3. WORRYING TOO MUCH ABOUT DIFFERENT THINGS: NEARLY EVERY DAY
4. TROUBLE RELAXING: NEARLY EVERY DAY
6. BECOMING EASILY ANNOYED OR IRRITABLE: MORE THAN HALF THE DAYS
IF YOU CHECKED OFF ANY PROBLEMS ON THIS QUESTIONNAIRE, HOW DIFFICULT HAVE THESE PROBLEMS MADE IT FOR YOU TO DO YOUR WORK, TAKE CARE OF THINGS AT HOME, OR GET ALONG WITH OTHER PEOPLE: SOMEWHAT DIFFICULT
GAD7 TOTAL SCORE: 17
2. NOT BEING ABLE TO STOP OR CONTROL WORRYING: NEARLY EVERY DAY
7. FEELING AFRAID AS IF SOMETHING AWFUL MIGHT HAPPEN: SEVERAL DAYS

## 2024-12-12 NOTE — PROGRESS NOTES
Nena Randle Keanu  1995        Chief Complaint   Patient presents with    New Patient       Assessment/Plan:     1. Anxiety and depression  Decrease Effexor from 150 to 75mg daily. Consider Genesight testing in future vs Wellbutrin addition vs Viibryd.   Enc to look into EAP. [facility], [facility].   Buspar up to three times a day PRN   Denies SI. Good support system.     - venlafaxine (EFFEXOR XR) 75 MG extended release capsule; Take 2 capsules by mouth daily  Dispense: 30 capsule; Refill: 1  - busPIRone (BUSPAR) 5 MG tablet; Take 1 tablet by mouth 3 times daily  Dispense: 90 tablet; Refill: 0      Return for 2 weeks, IO or VV 15-10 min.  Total time w pt and encounter: 45 min     HPI:      [facility] -  in [place]. Looking for new home closer to friends in [place].   Mother and father  9th grade high school.     July 2024, felt a sudden panic attack. Anxiety.       /82 (Site: Left Upper Arm, Position: Sitting)   Pulse 77   Ht 1.613 m (5' 3.5\")   Wt 63 kg (139 lb)   LMP 12/05/2024   SpO2 100%   BMI 24.24 kg/m²     Prior to Visit Medications    Medication Sig Taking? Authorizing Provider   venlafaxine (EFFEXOR XR) 75 MG extended release capsule Take 2 capsules by mouth daily Yes Missael Samson APRN - CNP   busPIRone (BUSPAR) 5 MG tablet Take 1 tablet by mouth 3 times daily Yes Missael Samson APRN - CNP   XULANE 150-35 MCG/24HR APPLY 1 PATCH PER WK NO PATCH WK 4 FOR CYCLE Yes Provider, Alejandro, MD     Family History   Problem Relation Age of Onset    Diabetes Paternal Grandmother      Social History     Socioeconomic History    Marital status: Single     Spouse name: Not on file    Number of children: Not on file    Years of education: Not on file    Highest education level: Not on file   Occupational History    Not on file   Tobacco Use    Smoking status: Never    Smokeless tobacco: Never   Vaping Use

## 2024-12-12 NOTE — PATIENT INSTRUCTIONS
Switch from Effexor 150 to 75mg once daily    Buspar --> Take twice a day and then if you need to take an extra dose during the day of with one of the other doses, that is ok!    Call Employee Assistance Program through school and see if they have any therapists that are convenient to you   Also can check out [facility] (read bios) and [facility] (read bios)

## 2024-12-26 ENCOUNTER — OFFICE VISIT (OUTPATIENT)
Dept: INTERNAL MEDICINE CLINIC | Age: 29
End: 2024-12-26
Payer: COMMERCIAL

## 2024-12-26 VITALS
WEIGHT: 135 LBS | HEART RATE: 64 BPM | SYSTOLIC BLOOD PRESSURE: 124 MMHG | BODY MASS INDEX: 23.54 KG/M2 | OXYGEN SATURATION: 97 % | DIASTOLIC BLOOD PRESSURE: 89 MMHG

## 2024-12-26 DIAGNOSIS — F32.A ANXIETY AND DEPRESSION: ICD-10-CM

## 2024-12-26 DIAGNOSIS — F41.9 ANXIETY AND DEPRESSION: ICD-10-CM

## 2024-12-26 PROCEDURE — G8484 FLU IMMUNIZE NO ADMIN: HCPCS | Performed by: NURSE PRACTITIONER

## 2024-12-26 PROCEDURE — G8420 CALC BMI NORM PARAMETERS: HCPCS | Performed by: NURSE PRACTITIONER

## 2024-12-26 PROCEDURE — 99213 OFFICE O/P EST LOW 20 MIN: CPT | Performed by: NURSE PRACTITIONER

## 2024-12-26 PROCEDURE — G8427 DOCREV CUR MEDS BY ELIG CLIN: HCPCS | Performed by: NURSE PRACTITIONER

## 2024-12-26 PROCEDURE — 1036F TOBACCO NON-USER: CPT | Performed by: NURSE PRACTITIONER

## 2024-12-26 RX ORDER — BUSPIRONE HYDROCHLORIDE 5 MG/1
5-10 TABLET ORAL 3 TIMES DAILY PRN
Qty: 120 TABLET | Refills: 0 | Status: SHIPPED | OUTPATIENT
Start: 2024-12-26 | End: 2025-01-25

## 2024-12-26 RX ORDER — VENLAFAXINE HYDROCHLORIDE 37.5 MG/1
37.5 CAPSULE, EXTENDED RELEASE ORAL DAILY
Qty: 30 CAPSULE | Refills: 1 | Status: SHIPPED | OUTPATIENT
Start: 2024-12-26

## 2024-12-26 ASSESSMENT — ENCOUNTER SYMPTOMS
COUGH: 0
SINUS PRESSURE: 0
NAUSEA: 0
SORE THROAT: 0
VOMITING: 0
DIARRHEA: 0
FACIAL SWELLING: 0

## 2024-12-26 NOTE — PROGRESS NOTES
Nena Randle Keanu  1995        Chief Complaint   Patient presents with    Follow-up     Two week follow, things better       Assessment/Plan:     1. Anxiety and depression  Continue to decrease Effexor and if any problems on this decrease consider adding Viibryd vs Wellbutrin.   Buspar increase to 1-2 pills TID PRN.   Enc counseling services to help with some family stressors and work life balance. (EAP)    Will fill out [place] paperwork if needed.     - venlafaxine (EFFEXOR XR) 37.5 MG extended release capsule; Take 1 capsule by mouth daily  Dispense: 30 capsule; Refill: 1  - busPIRone (BUSPAR) 5 MG tablet; Take 1-2 tablets by mouth 3 times daily as needed (anxiety)  Dispense: 120 tablet; Refill: 0      Return for 8 weeks, VV or IO 15 min.      HPI:      Effexor 75mg 1 dakota t ighttime.     Buspar BID and another dose sometimes mid day. She has taken a third dose a few times since last OV.   Some stress with holidays and Mom but she was ill so this made less stress so she only saw Dad's side of family.     She finds her mother is a large part of the problem and she is learning how to set boundaries.       /89 (Site: Left Upper Arm, Position: Sitting)   Pulse 64   Wt 61.2 kg (135 lb)   LMP 12/05/2024   SpO2 97%   BMI 23.54 kg/m²     Prior to Visit Medications    Medication Sig Taking? Authorizing Provider   venlafaxine (EFFEXOR XR) 37.5 MG extended release capsule Take 1 capsule by mouth daily Yes Missael Samson APRN - CNP   busPIRone (BUSPAR) 5 MG tablet Take 1-2 tablets by mouth 3 times daily as needed (anxiety) Yes Missael Samson APRN - CNP   XULANE 150-35 MCG/24HR APPLY 1 PATCH PER WK NO PATCH WK 4 FOR CYCLE Yes Provider, Alejandro, MD     Family History   Problem Relation Age of Onset    Diabetes Paternal Grandmother      Social History     Socioeconomic History    Marital status: Single     Spouse name: Not on file    Number of children: Not on file    Years of education: Not on file    
  Transportation Needs: Unknown (7/29/2024)    PRAPARE - Transportation     Lack of Transportation (Medical): Not on file     Lack of Transportation (Non-Medical): No   Physical Activity: Insufficiently Active (12/11/2024)    Exercise Vital Sign     Days of Exercise per Week: 3 days     Minutes of Exercise per Session: 30 min   Stress: Not on file   Social Connections: Not on file   Intimate Partner Violence: Unknown (1/20/2024)    Received from [facility], [facility]    Interpersonal Safety     Feel physically or emotionally unsafe where currently live: Not on file     Harm by anyone: Not on file     Emotionally Harmed: Not on file   Housing Stability: Unknown (7/29/2024)    Housing Stability Vital Sign     Unable to Pay for Housing in the Last Year: Not on file     Number of Places Lived in the Last Year: Not on file     Unstable Housing in the Last Year: No       Review of Systems    Physical Exam        See above plan.         Missael Samson, APRN - CNP

## 2024-12-26 NOTE — PATIENT INSTRUCTIONS
Employee Assistance Program --> ask HR at work and they will give you information for counselors in your area that are covered    Ask HR about FMLA paperwork to be filled out.     Decrease Effexor XR 37.5mg daily.     Buspar 5-10mg as needed up to three times a day.

## 2025-01-10 ENCOUNTER — PATIENT MESSAGE (OUTPATIENT)
Dept: INTERNAL MEDICINE CLINIC | Age: 30
End: 2025-01-10

## 2025-01-14 ENCOUNTER — TELEPHONE (OUTPATIENT)
Dept: INTERNAL MEDICINE CLINIC | Age: 30
End: 2025-01-14

## 2025-01-14 NOTE — TELEPHONE ENCOUNTER
Patient called and stated she missed taking a dose of her venlafaxine, and now she is feeling dizzy and just \"not right\".  Patient wants to know if the dizziness might have been caused by the missed dose of venlafaxine. She also wants to know if she should now take 2 doses. Please call patient at [phone]

## 2025-01-16 ENCOUNTER — PATIENT MESSAGE (OUTPATIENT)
Dept: INTERNAL MEDICINE CLINIC | Age: 30
End: 2025-01-16

## 2025-01-17 RX ORDER — BUPROPION HYDROCHLORIDE 150 MG/1
150 TABLET ORAL EVERY MORNING
Qty: 30 TABLET | Refills: 1 | Status: SHIPPED | OUTPATIENT
Start: 2025-01-17

## 2025-01-20 ENCOUNTER — OFFICE VISIT (OUTPATIENT)
Dept: INTERNAL MEDICINE CLINIC | Age: 30
End: 2025-01-20
Payer: COMMERCIAL

## 2025-01-20 VITALS
DIASTOLIC BLOOD PRESSURE: 70 MMHG | BODY MASS INDEX: 25.44 KG/M2 | HEART RATE: 81 BPM | SYSTOLIC BLOOD PRESSURE: 110 MMHG | TEMPERATURE: 98.3 F | OXYGEN SATURATION: 99 % | HEIGHT: 64 IN | WEIGHT: 149 LBS

## 2025-01-20 DIAGNOSIS — F41.9 ANXIETY AND DEPRESSION: Primary | ICD-10-CM

## 2025-01-20 DIAGNOSIS — F32.A ANXIETY AND DEPRESSION: Primary | ICD-10-CM

## 2025-01-20 PROCEDURE — G8427 DOCREV CUR MEDS BY ELIG CLIN: HCPCS | Performed by: NURSE PRACTITIONER

## 2025-01-20 PROCEDURE — 1036F TOBACCO NON-USER: CPT | Performed by: NURSE PRACTITIONER

## 2025-01-20 PROCEDURE — 99214 OFFICE O/P EST MOD 30 MIN: CPT | Performed by: NURSE PRACTITIONER

## 2025-01-20 PROCEDURE — G8419 CALC BMI OUT NRM PARAM NOF/U: HCPCS | Performed by: NURSE PRACTITIONER

## 2025-01-20 RX ORDER — BUPROPION HYDROCHLORIDE 150 MG/1
150 TABLET ORAL EVERY MORNING
Qty: 30 TABLET | Refills: 1 | Status: SHIPPED | OUTPATIENT
Start: 2025-01-20

## 2025-01-20 RX ORDER — VILAZODONE HYDROCHLORIDE 10 MG/1
10 TABLET ORAL DAILY
Qty: 30 TABLET | Refills: 0 | Status: SHIPPED | OUTPATIENT
Start: 2025-01-20

## 2025-01-20 SDOH — ECONOMIC STABILITY: FOOD INSECURITY: WITHIN THE PAST 12 MONTHS, YOU WORRIED THAT YOUR FOOD WOULD RUN OUT BEFORE YOU GOT MONEY TO BUY MORE.: NEVER TRUE

## 2025-01-20 SDOH — ECONOMIC STABILITY: INCOME INSECURITY: IN THE LAST 12 MONTHS, WAS THERE A TIME WHEN YOU WERE NOT ABLE TO PAY THE MORTGAGE OR RENT ON TIME?: NO

## 2025-01-20 SDOH — ECONOMIC STABILITY: FOOD INSECURITY: WITHIN THE PAST 12 MONTHS, THE FOOD YOU BOUGHT JUST DIDN'T LAST AND YOU DIDN'T HAVE MONEY TO GET MORE.: NEVER TRUE

## 2025-01-20 SDOH — ECONOMIC STABILITY: TRANSPORTATION INSECURITY
IN THE PAST 12 MONTHS, HAS THE LACK OF TRANSPORTATION KEPT YOU FROM MEDICAL APPOINTMENTS OR FROM GETTING MEDICATIONS?: NO

## 2025-01-20 SDOH — ECONOMIC STABILITY: TRANSPORTATION INSECURITY
IN THE PAST 12 MONTHS, HAS LACK OF TRANSPORTATION KEPT YOU FROM MEETINGS, WORK, OR FROM GETTING THINGS NEEDED FOR DAILY LIVING?: NO

## 2025-01-20 ASSESSMENT — ENCOUNTER SYMPTOMS
VOMITING: 0
NAUSEA: 0
COUGH: 0
SINUS PRESSURE: 0
FACIAL SWELLING: 0
DIARRHEA: 0
SORE THROAT: 0

## 2025-01-20 ASSESSMENT — PATIENT HEALTH QUESTIONNAIRE - PHQ9
SUM OF ALL RESPONSES TO PHQ QUESTIONS 1-9: 10
6. FEELING BAD ABOUT YOURSELF - OR THAT YOU ARE A FAILURE OR HAVE LET YOURSELF OR YOUR FAMILY DOWN: SEVERAL DAYS
1. LITTLE INTEREST OR PLEASURE IN DOING THINGS: MORE THAN HALF THE DAYS
9. THOUGHTS THAT YOU WOULD BE BETTER OFF DEAD, OR OF HURTING YOURSELF: SEVERAL DAYS
4. FEELING TIRED OR HAVING LITTLE ENERGY: SEVERAL DAYS
SUM OF ALL RESPONSES TO PHQ QUESTIONS 1-9: 9
4. FEELING TIRED OR HAVING LITTLE ENERGY: SEVERAL DAYS
SUM OF ALL RESPONSES TO PHQ QUESTIONS 1-9: 10
SUM OF ALL RESPONSES TO PHQ QUESTIONS 1-9: 10
3. TROUBLE FALLING OR STAYING ASLEEP: SEVERAL DAYS
2. FEELING DOWN, DEPRESSED OR HOPELESS: SEVERAL DAYS
10. IF YOU CHECKED OFF ANY PROBLEMS, HOW DIFFICULT HAVE THESE PROBLEMS MADE IT FOR YOU TO DO YOUR WORK, TAKE CARE OF THINGS AT HOME, OR GET ALONG WITH OTHER PEOPLE: VERY DIFFICULT
7. TROUBLE CONCENTRATING ON THINGS, SUCH AS READING THE NEWSPAPER OR WATCHING TELEVISION: SEVERAL DAYS
10. IF YOU CHECKED OFF ANY PROBLEMS, HOW DIFFICULT HAVE THESE PROBLEMS MADE IT FOR YOU TO DO YOUR WORK, TAKE CARE OF THINGS AT HOME, OR GET ALONG WITH OTHER PEOPLE: VERY DIFFICULT
6. FEELING BAD ABOUT YOURSELF - OR THAT YOU ARE A FAILURE OR HAVE LET YOURSELF OR YOUR FAMILY DOWN: SEVERAL DAYS
5. POOR APPETITE OR OVEREATING: SEVERAL DAYS
SUM OF ALL RESPONSES TO PHQ9 QUESTIONS 1 & 2: 3
7. TROUBLE CONCENTRATING ON THINGS, SUCH AS READING THE NEWSPAPER OR WATCHING TELEVISION: SEVERAL DAYS
8. MOVING OR SPEAKING SO SLOWLY THAT OTHER PEOPLE COULD HAVE NOTICED. OR THE OPPOSITE, BEING SO FIGETY OR RESTLESS THAT YOU HAVE BEEN MOVING AROUND A LOT MORE THAN USUAL: SEVERAL DAYS
1. LITTLE INTEREST OR PLEASURE IN DOING THINGS: MORE THAN HALF THE DAYS
9. THOUGHTS THAT YOU WOULD BE BETTER OFF DEAD, OR OF HURTING YOURSELF: SEVERAL DAYS
SUM OF ALL RESPONSES TO PHQ QUESTIONS 1-9: 10
8. MOVING OR SPEAKING SO SLOWLY THAT OTHER PEOPLE COULD HAVE NOTICED. OR THE OPPOSITE - BEING SO FIDGETY OR RESTLESS THAT YOU HAVE BEEN MOVING AROUND A LOT MORE THAN USUAL: SEVERAL DAYS
5. POOR APPETITE OR OVEREATING: SEVERAL DAYS
2. FEELING DOWN, DEPRESSED OR HOPELESS: SEVERAL DAYS
3. TROUBLE FALLING OR STAYING ASLEEP: SEVERAL DAYS

## 2025-01-20 ASSESSMENT — COLUMBIA-SUICIDE SEVERITY RATING SCALE - C-SSRS
1. IN THE PAST MONTH, HAVE YOU WISHED YOU WERE DEAD OR WISHED YOU COULD GO TO SLEEP AND NOT WAKE UP?: YES
6. IN YOUR LIFETIME, HAVE YOU EVER DONE ANYTHING, STARTED TO DO ANYTHING, OR PREPARED TO DO ANYTHING TO END YOUR LIFE?: NO
2. IN THE PAST MONTH, HAVE YOU ACTUALLY HAD ANY THOUGHTS OF KILLING YOURSELF?: NO

## 2025-01-20 NOTE — PATIENT INSTRUCTIONS
Viibryd 10mg once daily in AM.     Wellbutrin XL 150mg in AM.     STOP Effexor.     Maintain Buspar as needed.     Limit any marijuana and alcohol use at this time.    I will contact you as soon as I hear from Candie.    [facility] will be able to set up virtual visit soon and then we can switch to in person.

## 2025-01-20 NOTE — PROGRESS NOTES
Nena Randle Keanu  1995        Chief Complaint   Patient presents with    Follow-up    Anxiety     Discuss new anxiety med. Patient unable to start wellbutrin due pharmacy not having filled it yet. Willing to try different pharmacy       Assessment/Plan:     1. Anxiety and depression  Stressed importance of meeting with therapist. May need to reach out for [facility]. Contacted Candie but have not heard back from her at [facility].   ADD wellbutrin in AM and Viibryd 10mg once daily. Increase in 7 days.   Consider Genesight testing in future.   Maintain Buspar  Denies SI/HI today and states she has \"too many little people that look up to her\".   Limit MJ use and ETOH    - buPROPion (WELLBUTRIN XL) 150 MG extended release tablet; Take 1 tablet by mouth every morning  Dispense: 30 tablet; Refill: 1  - vilazodone HCl (VIIBRYD) 10 MG TABS; Take 1 tablet by mouth daily  Dispense: 30 tablet; Refill: 0      Return for 6-8 weeks, 30 min f/u anxiety/depression.  Total time w pt and encounter: 35 min     HPI:      Pt feels she is struggling on Effexor. If she misses a dose she feels \"brain zaps\" feels very poor. She has decreased down to 37.5mg once daily.   She has been struggling with Mom and Dad relationships as well. She feels they rely on her and discuss their own problems with her too much. She wrote them each letters and this was tough.   She felt very low after writing the letters but denies SI/HI. Depression was high last Wednesday.     She has very good friend support and family.    No ETOH intake for 1 week, this feels good. Has been using THC gummies at nighttime as this makes her \"drowsy and calm\".     She has not filled Wellbutrin but it is at pharmacy.       /70   Pulse 81   Temp 98.3 °F (36.8 °C)   Ht 1.613 m (5' 3.5\")   Wt 67.6 kg (149 lb)   SpO2 99%   BMI 25.98 kg/m²     Prior to Visit Medications    Medication Sig Taking? Authorizing Provider   buPROPion (WELLBUTRIN

## 2025-01-31 ENCOUNTER — PATIENT MESSAGE (OUTPATIENT)
Dept: INTERNAL MEDICINE CLINIC | Age: 30
End: 2025-01-31

## 2025-02-18 DIAGNOSIS — F32.A ANXIETY AND DEPRESSION: ICD-10-CM

## 2025-02-18 DIAGNOSIS — F41.9 ANXIETY AND DEPRESSION: ICD-10-CM

## 2025-02-18 NOTE — TELEPHONE ENCOUNTER
Refill request for BUSPIRONE 5MG TABLETS medication.     Name of Pharmacy- [facility]      Last visit - 1-     Pending visit - 3-3-2025    Last refill - 12-      Medication Contract signed -   Last Oarrs ran-         Additional Comments

## 2025-02-19 RX ORDER — BUSPIRONE HYDROCHLORIDE 5 MG/1
TABLET ORAL
Qty: 120 TABLET | Refills: 0 | Status: SHIPPED | OUTPATIENT
Start: 2025-02-19

## 2025-02-25 DIAGNOSIS — F32.A ANXIETY AND DEPRESSION: ICD-10-CM

## 2025-02-25 DIAGNOSIS — F41.9 ANXIETY AND DEPRESSION: ICD-10-CM

## 2025-02-25 NOTE — TELEPHONE ENCOUNTER
Refill request for VILAZODONE 10MG TABLETS medication.     Name of Pharmacy- [facility]      Last visit - 1-     Pending visit - 3-3-2025    Last refill - 1-      Medication Contract signed -   Last Oarrs ran-         Additional Comments

## 2025-02-27 RX ORDER — VILAZODONE HYDROCHLORIDE 10 MG/1
10 TABLET ORAL DAILY
Qty: 30 TABLET | Refills: 0 | Status: SHIPPED | OUTPATIENT
Start: 2025-02-27

## 2025-03-03 ENCOUNTER — OFFICE VISIT (OUTPATIENT)
Dept: INTERNAL MEDICINE CLINIC | Age: 30
End: 2025-03-03
Payer: COMMERCIAL

## 2025-03-03 VITALS
SYSTOLIC BLOOD PRESSURE: 115 MMHG | DIASTOLIC BLOOD PRESSURE: 79 MMHG | WEIGHT: 150 LBS | HEART RATE: 78 BPM | BODY MASS INDEX: 26.15 KG/M2 | OXYGEN SATURATION: 100 %

## 2025-03-03 DIAGNOSIS — Z00.00 ROUTINE GENERAL MEDICAL EXAMINATION AT A HEALTH CARE FACILITY: Primary | ICD-10-CM

## 2025-03-03 DIAGNOSIS — F32.A ANXIETY AND DEPRESSION: ICD-10-CM

## 2025-03-03 DIAGNOSIS — F41.9 ANXIETY AND DEPRESSION: ICD-10-CM

## 2025-03-03 PROCEDURE — G8419 CALC BMI OUT NRM PARAM NOF/U: HCPCS | Performed by: NURSE PRACTITIONER

## 2025-03-03 PROCEDURE — G8427 DOCREV CUR MEDS BY ELIG CLIN: HCPCS | Performed by: NURSE PRACTITIONER

## 2025-03-03 PROCEDURE — 1036F TOBACCO NON-USER: CPT | Performed by: NURSE PRACTITIONER

## 2025-03-03 PROCEDURE — 99213 OFFICE O/P EST LOW 20 MIN: CPT | Performed by: NURSE PRACTITIONER

## 2025-03-03 RX ORDER — VILAZODONE HYDROCHLORIDE 20 MG/1
20 TABLET ORAL DAILY
Qty: 90 TABLET | Refills: 1 | Status: SHIPPED | OUTPATIENT
Start: 2025-03-03

## 2025-03-03 RX ORDER — BUPROPION HYDROCHLORIDE 150 MG/1
150 TABLET ORAL EVERY MORNING
Qty: 90 TABLET | Refills: 1 | Status: SHIPPED | OUTPATIENT
Start: 2025-03-03

## 2025-03-03 ASSESSMENT — ENCOUNTER SYMPTOMS
DIARRHEA: 0
COUGH: 0
SINUS PRESSURE: 0
SORE THROAT: 0
FACIAL SWELLING: 0
NAUSEA: 0
VOMITING: 0

## 2025-03-03 NOTE — PROGRESS NOTES
Harmed: Not on file   Housing Stability: Low Risk  (1/20/2025)    Housing Stability Vital Sign     Unable to Pay for Housing in the Last Year: No     Number of Times Moved in the Last Year: 0     Homeless in the Last Year: No       Review of Systems   Constitutional:  Negative for appetite change, chills, fatigue, fever and unexpected weight change.   HENT:  Negative for congestion, ear discharge, ear pain, facial swelling, hearing loss, sinus pressure, sneezing and sore throat.    Respiratory:  Negative for cough.    Cardiovascular:  Negative for chest pain.   Gastrointestinal:  Negative for diarrhea, nausea and vomiting.   Genitourinary:  Negative for difficulty urinating, dysuria, hematuria and urgency.   Musculoskeletal:  Negative for arthralgias and gait problem.   Neurological:  Negative for dizziness, weakness and headaches.   Hematological:  Negative for adenopathy.   Psychiatric/Behavioral:  Positive for dysphoric mood and sleep disturbance. Negative for suicidal ideas. The patient is nervous/anxious.        Physical Exam  Vitals reviewed.   HENT:      Head: Normocephalic.   Cardiovascular:      Rate and Rhythm: Normal rate and regular rhythm.      Heart sounds: Normal heart sounds.   Pulmonary:      Effort: Pulmonary effort is normal.      Breath sounds: Normal breath sounds.   Neurological:      General: No focal deficit present.      Mental Status: She is alert and oriented to person, place, and time.   Psychiatric:         Mood and Affect: Mood normal.         Thought Content: Thought content normal.         Judgment: Judgment normal.             See above plan.         Missael Samson, APRN - CNP

## 2025-03-03 NOTE — PATIENT INSTRUCTIONS
Increase Viibryd to 20mg daily  --> Let us know if any problems with cost    Maintain all other medications.     Perform blood test 1 week prior to next appointment, fasting

## 2025-04-15 ENCOUNTER — OFFICE VISIT (OUTPATIENT)
Age: 30
End: 2025-04-15

## 2025-04-15 VITALS
BODY MASS INDEX: 23.32 KG/M2 | OXYGEN SATURATION: 98 % | HEIGHT: 65 IN | WEIGHT: 140 LBS | SYSTOLIC BLOOD PRESSURE: 114 MMHG | TEMPERATURE: 98.7 F | RESPIRATION RATE: 16 BRPM | HEART RATE: 79 BPM | DIASTOLIC BLOOD PRESSURE: 76 MMHG

## 2025-04-15 DIAGNOSIS — R05.1 ACUTE COUGH: ICD-10-CM

## 2025-04-15 DIAGNOSIS — J01.00 ACUTE NON-RECURRENT MAXILLARY SINUSITIS: Primary | ICD-10-CM

## 2025-04-15 RX ORDER — AZITHROMYCIN 250 MG/1
TABLET, FILM COATED ORAL
Qty: 6 TABLET | Refills: 0 | Status: SHIPPED | OUTPATIENT
Start: 2025-04-15

## 2025-04-15 ASSESSMENT — ENCOUNTER SYMPTOMS
SINUS PRESSURE: 1
COUGH: 1

## 2025-04-15 NOTE — PATIENT INSTRUCTIONS
Nena,    Thank you for trusting [facility] with your care. Your decision to come to us means a lot and we are honored to be part of your healthcare journey. We value your trust and hope your experience with us was positive and met your expectations.    We're always looking for ways to improve, and your feedback is incredibly important to us. You will receive a text or email soon asking you how your visit went. for If you could take a moment to share your thoughts, it would mean the world to us. Your input helps us better serve you and others in the community.     Thank you again for choosing us. We're grateful for the opportunity to care for you and your loved ones. We hope to see you again - though we always wish you health and wellness!    Warm regards,    [facility] Team    Saige Merrill PSR/MA, Valentina Plunkett RT, and Ryanne Almaguer NP-C

## 2025-04-15 NOTE — PROGRESS NOTES
Nena Colunga (: 1995) is a 29 y.o. female, New patient, here for evaluation of the following chief complaint(s):  Ear Pain (Sxs 1 day both otc not taken )        ASSESSMENT/PLAN:    ICD-10-CM    1. Acute non-recurrent maxillary sinusitis  J01.00 azithromycin (ZITHROMAX) 250 MG tablet      2. Acute cough  R05.1           - Acute Bacterial Sinusitis:   Given symptoms for over 3 weeks, with worsening of symptoms including congestion and sinus pressure/pain, and exam findings of maxillary sinus tenderness to palpation, there is concern for bacterial sinusitis complicating an underlying viral URI.  Low concern for otitis media, Strep pharyngitis, respiratory distress, pneumonia, bronchitis, and PE.  Azithromycin prescribed for antibiotic treatment.  Recommended OTC medication and home remedy treatments for symptomatic relief  Strict ED follow up instructions provided  Discussed PCP follow up for persisting or worsening symptoms, or to return to the clinic if unable to obtain PCP follow up for worsening symptoms.    The patient tolerated their visit well. A time was given to answer questions and a plan was established, proposed, and was agreed upon. Reviewed AVS with treatment instructions and answered questions - patient acknowledges understanding and agreement with the discussed treatment plan and AVS instructions.      SUBJECTIVE/OBJECTIVE:  HPI:   29 y.o. female presents for complaint of bilateral ear pain x 1 day.    Admits has been battling a sinus infection for about 2-3 weeks now. Yesterday, right ear was hurting. Today, she cannot hear out of right ear and left ear is now painful. Intermittent cough. Sinus pressure on face.   Denies sore throat, nausea, vomiting, dizziness, lightheadedness, chest tightness    Rest makes symptoms better.  Nothing makes symptoms worse.    Has attempted rest for symptoms     Pt provided HPI by themself - pt considered to be a reliable historian.        Ear Pain

## 2025-04-22 ENCOUNTER — PATIENT MESSAGE (OUTPATIENT)
Dept: INTERNAL MEDICINE CLINIC | Age: 30
End: 2025-04-22

## 2025-05-07 ENCOUNTER — TELEMEDICINE (OUTPATIENT)
Dept: PSYCHIATRY | Age: 30
End: 2025-05-07
Payer: COMMERCIAL

## 2025-05-07 DIAGNOSIS — F43.9 TRAUMA AND STRESSOR-RELATED DISORDER: ICD-10-CM

## 2025-05-07 DIAGNOSIS — F32.A DEPRESSION, UNSPECIFIED DEPRESSION TYPE: ICD-10-CM

## 2025-05-07 DIAGNOSIS — F41.9 ANXIETY: Primary | ICD-10-CM

## 2025-05-07 PROCEDURE — G8427 DOCREV CUR MEDS BY ELIG CLIN: HCPCS | Performed by: STUDENT IN AN ORGANIZED HEALTH CARE EDUCATION/TRAINING PROGRAM

## 2025-05-07 PROCEDURE — G8420 CALC BMI NORM PARAMETERS: HCPCS | Performed by: STUDENT IN AN ORGANIZED HEALTH CARE EDUCATION/TRAINING PROGRAM

## 2025-05-07 PROCEDURE — 99205 OFFICE O/P NEW HI 60 MIN: CPT | Performed by: STUDENT IN AN ORGANIZED HEALTH CARE EDUCATION/TRAINING PROGRAM

## 2025-05-07 PROCEDURE — 1036F TOBACCO NON-USER: CPT | Performed by: STUDENT IN AN ORGANIZED HEALTH CARE EDUCATION/TRAINING PROGRAM

## 2025-05-07 RX ORDER — CITALOPRAM HYDROBROMIDE 20 MG/1
20 TABLET ORAL DAILY
Qty: 30 TABLET | Refills: 2 | Status: SHIPPED | OUTPATIENT
Start: 2025-05-07

## 2025-05-07 ASSESSMENT — ANXIETY QUESTIONNAIRES
6. BECOMING EASILY ANNOYED OR IRRITABLE: SEVERAL DAYS
GAD7 TOTAL SCORE: 10
1. FEELING NERVOUS, ANXIOUS, OR ON EDGE: MORE THAN HALF THE DAYS
7. FEELING AFRAID AS IF SOMETHING AWFUL MIGHT HAPPEN: SEVERAL DAYS
5. BEING SO RESTLESS THAT IT IS HARD TO SIT STILL: NOT AT ALL
4. TROUBLE RELAXING: MORE THAN HALF THE DAYS
2. NOT BEING ABLE TO STOP OR CONTROL WORRYING: MORE THAN HALF THE DAYS
3. WORRYING TOO MUCH ABOUT DIFFERENT THINGS: MORE THAN HALF THE DAYS

## 2025-05-07 ASSESSMENT — PATIENT HEALTH QUESTIONNAIRE - PHQ9
SUM OF ALL RESPONSES TO PHQ QUESTIONS 1-9: 15
SUM OF ALL RESPONSES TO PHQ QUESTIONS 1-9: 16
5. POOR APPETITE OR OVEREATING: NEARLY EVERY DAY
SUM OF ALL RESPONSES TO PHQ QUESTIONS 1-9: 16
SUM OF ALL RESPONSES TO PHQ QUESTIONS 1-9: 16
8. MOVING OR SPEAKING SO SLOWLY THAT OTHER PEOPLE COULD HAVE NOTICED. OR THE OPPOSITE, BEING SO FIGETY OR RESTLESS THAT YOU HAVE BEEN MOVING AROUND A LOT MORE THAN USUAL: NOT AT ALL
6. FEELING BAD ABOUT YOURSELF - OR THAT YOU ARE A FAILURE OR HAVE LET YOURSELF OR YOUR FAMILY DOWN: MORE THAN HALF THE DAYS
9. THOUGHTS THAT YOU WOULD BE BETTER OFF DEAD, OR OF HURTING YOURSELF: SEVERAL DAYS
7. TROUBLE CONCENTRATING ON THINGS, SUCH AS READING THE NEWSPAPER OR WATCHING TELEVISION: SEVERAL DAYS
4. FEELING TIRED OR HAVING LITTLE ENERGY: MORE THAN HALF THE DAYS
2. FEELING DOWN, DEPRESSED OR HOPELESS: NEARLY EVERY DAY
1. LITTLE INTEREST OR PLEASURE IN DOING THINGS: SEVERAL DAYS
3. TROUBLE FALLING OR STAYING ASLEEP: NEARLY EVERY DAY

## 2025-05-07 NOTE — PROGRESS NOTES
Outpatient Psychiatry Integrated Care   [facility]     Patient name: Nena Colunga  YOB: 1995  MRN: [id number]  Day of Service: 5/7/2025      Referring Primary Care Clinician: Missael Samson APRN - CNP    Video Visit: Nena Colunga, was evaluated through a synchronous (real-time) audio-video encounter. The patient (or guardian if applicable) is aware that this is a billable service, which includes applicable co-pays. This Virtual Visit was conducted with patient's (and/or legal guardian's) consent. Patient identification was verified, and a caregiver was present when appropriate.   The patient was located at Home: [address] [place] [address] [place] [address]  Provider was located at Home (Appt Dept State): [place]  Confirm you are appropriately licensed, registered, or certified to deliver care in the state where the patient is located as indicated above. If you are not or unsure, please re-schedule the visit: Yes, I confirm.       Reason for Visit:     Chief Complaint   Patient presents with    New Patient       CC: anxiety    HPI:     Nena Colunga is a 29 y.o. White (non-) female with a history of anxiety and depression who presents to outpatient primary care psychiatry on 5/7/2025 for psychiatric medication management.      Today, the patient reports her mental health struggles began for the first time right after she came back from [place] in Feb 2024 and old memories of childhood came flooding back. Her anxiety and childhood trauma started to affect her upon her return. There was a lot of conflict in her house growing up that was never resolved (yelling and slamming doors).  She went from being the most social person on the planet to not being able to be around many people at all in the summer of 2024. She has some good days and some bad days, but the bad days are really bad and she feels totally overwhelmed by specific events and shuts down for extended
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
## 2025-06-03 ASSESSMENT — PATIENT HEALTH QUESTIONNAIRE - PHQ9
3. TROUBLE FALLING OR STAYING ASLEEP: NOT AT ALL
SUM OF ALL RESPONSES TO PHQ QUESTIONS 1-9: 2
2. FEELING DOWN, DEPRESSED OR HOPELESS: NOT AT ALL
9. THOUGHTS THAT YOU WOULD BE BETTER OFF DEAD, OR OF HURTING YOURSELF: NOT AT ALL
6. FEELING BAD ABOUT YOURSELF - OR THAT YOU ARE A FAILURE OR HAVE LET YOURSELF OR YOUR FAMILY DOWN: NOT AT ALL
4. FEELING TIRED OR HAVING LITTLE ENERGY: SEVERAL DAYS
1. LITTLE INTEREST OR PLEASURE IN DOING THINGS: NOT AT ALL
10. IF YOU CHECKED OFF ANY PROBLEMS, HOW DIFFICULT HAVE THESE PROBLEMS MADE IT FOR YOU TO DO YOUR WORK, TAKE CARE OF THINGS AT HOME, OR GET ALONG WITH OTHER PEOPLE: NOT DIFFICULT AT ALL
SUM OF ALL RESPONSES TO PHQ QUESTIONS 1-9: 2
10. IF YOU CHECKED OFF ANY PROBLEMS, HOW DIFFICULT HAVE THESE PROBLEMS MADE IT FOR YOU TO DO YOUR WORK, TAKE CARE OF THINGS AT HOME, OR GET ALONG WITH OTHER PEOPLE: NOT DIFFICULT AT ALL
5. POOR APPETITE OR OVEREATING: NOT AT ALL
8. MOVING OR SPEAKING SO SLOWLY THAT OTHER PEOPLE COULD HAVE NOTICED. OR THE OPPOSITE, BEING SO FIGETY OR RESTLESS THAT YOU HAVE BEEN MOVING AROUND A LOT MORE THAN USUAL: NOT AT ALL
8. MOVING OR SPEAKING SO SLOWLY THAT OTHER PEOPLE COULD HAVE NOTICED. OR THE OPPOSITE - BEING SO FIDGETY OR RESTLESS THAT YOU HAVE BEEN MOVING AROUND A LOT MORE THAN USUAL: NOT AT ALL
9. THOUGHTS THAT YOU WOULD BE BETTER OFF DEAD, OR OF HURTING YOURSELF: NOT AT ALL
SUM OF ALL RESPONSES TO PHQ QUESTIONS 1-9: 2
6. FEELING BAD ABOUT YOURSELF - OR THAT YOU ARE A FAILURE OR HAVE LET YOURSELF OR YOUR FAMILY DOWN: NOT AT ALL
1. LITTLE INTEREST OR PLEASURE IN DOING THINGS: NOT AT ALL
7. TROUBLE CONCENTRATING ON THINGS, SUCH AS READING THE NEWSPAPER OR WATCHING TELEVISION: SEVERAL DAYS
3. TROUBLE FALLING OR STAYING ASLEEP: NOT AT ALL
7. TROUBLE CONCENTRATING ON THINGS, SUCH AS READING THE NEWSPAPER OR WATCHING TELEVISION: SEVERAL DAYS
2. FEELING DOWN, DEPRESSED OR HOPELESS: NOT AT ALL
SUM OF ALL RESPONSES TO PHQ QUESTIONS 1-9: 2
SUM OF ALL RESPONSES TO PHQ QUESTIONS 1-9: 2
4. FEELING TIRED OR HAVING LITTLE ENERGY: SEVERAL DAYS
5. POOR APPETITE OR OVEREATING: NOT AT ALL

## 2025-06-03 ASSESSMENT — ANXIETY QUESTIONNAIRES
4. TROUBLE RELAXING: NOT AT ALL
5. BEING SO RESTLESS THAT IT IS HARD TO SIT STILL: NOT AT ALL
IF YOU CHECKED OFF ANY PROBLEMS ON THIS QUESTIONNAIRE, HOW DIFFICULT HAVE THESE PROBLEMS MADE IT FOR YOU TO DO YOUR WORK, TAKE CARE OF THINGS AT HOME, OR GET ALONG WITH OTHER PEOPLE: NOT DIFFICULT AT ALL
2. NOT BEING ABLE TO STOP OR CONTROL WORRYING: NOT AT ALL
GAD7 TOTAL SCORE: 0
2. NOT BEING ABLE TO STOP OR CONTROL WORRYING: NOT AT ALL
IF YOU CHECKED OFF ANY PROBLEMS ON THIS QUESTIONNAIRE, HOW DIFFICULT HAVE THESE PROBLEMS MADE IT FOR YOU TO DO YOUR WORK, TAKE CARE OF THINGS AT HOME, OR GET ALONG WITH OTHER PEOPLE: NOT DIFFICULT AT ALL
3. WORRYING TOO MUCH ABOUT DIFFERENT THINGS: NOT AT ALL
5. BEING SO RESTLESS THAT IT IS HARD TO SIT STILL: NOT AT ALL
1. FEELING NERVOUS, ANXIOUS, OR ON EDGE: NOT AT ALL
4. TROUBLE RELAXING: NOT AT ALL
7. FEELING AFRAID AS IF SOMETHING AWFUL MIGHT HAPPEN: NOT AT ALL
7. FEELING AFRAID AS IF SOMETHING AWFUL MIGHT HAPPEN: NOT AT ALL
3. WORRYING TOO MUCH ABOUT DIFFERENT THINGS: NOT AT ALL
1. FEELING NERVOUS, ANXIOUS, OR ON EDGE: NOT AT ALL
6. BECOMING EASILY ANNOYED OR IRRITABLE: NOT AT ALL
6. BECOMING EASILY ANNOYED OR IRRITABLE: NOT AT ALL

## 2025-06-04 ENCOUNTER — TELEMEDICINE (OUTPATIENT)
Dept: PSYCHIATRY | Age: 30
End: 2025-06-04
Payer: COMMERCIAL

## 2025-06-04 DIAGNOSIS — F41.9 ANXIETY AND DEPRESSION: ICD-10-CM

## 2025-06-04 DIAGNOSIS — F32.A ANXIETY AND DEPRESSION: Primary | ICD-10-CM

## 2025-06-04 DIAGNOSIS — F43.9 TRAUMA AND STRESSOR-RELATED DISORDER: ICD-10-CM

## 2025-06-04 DIAGNOSIS — F32.A ANXIETY AND DEPRESSION: ICD-10-CM

## 2025-06-04 DIAGNOSIS — F41.9 ANXIETY AND DEPRESSION: Primary | ICD-10-CM

## 2025-06-04 PROCEDURE — 99214 OFFICE O/P EST MOD 30 MIN: CPT | Performed by: STUDENT IN AN ORGANIZED HEALTH CARE EDUCATION/TRAINING PROGRAM

## 2025-06-04 PROCEDURE — 1036F TOBACCO NON-USER: CPT | Performed by: STUDENT IN AN ORGANIZED HEALTH CARE EDUCATION/TRAINING PROGRAM

## 2025-06-04 PROCEDURE — G8420 CALC BMI NORM PARAMETERS: HCPCS | Performed by: STUDENT IN AN ORGANIZED HEALTH CARE EDUCATION/TRAINING PROGRAM

## 2025-06-04 PROCEDURE — G8427 DOCREV CUR MEDS BY ELIG CLIN: HCPCS | Performed by: STUDENT IN AN ORGANIZED HEALTH CARE EDUCATION/TRAINING PROGRAM

## 2025-06-04 RX ORDER — BUSPIRONE HYDROCHLORIDE 5 MG/1
TABLET ORAL
Qty: 120 TABLET | Refills: 0 | Status: SHIPPED | OUTPATIENT
Start: 2025-06-04 | End: 2025-07-30 | Stop reason: SDUPTHER

## 2025-06-04 NOTE — PROGRESS NOTES
that you have been moving around a lot more than usual 0 0   Thoughts that you would be better off dead, or of hurting yourself in some way 0 1   PHQ-9 Total Score 2  16   If you checked off any problems, how difficult have these problems made it for you to do your work, take care of things at home, or get along with other people? 0        Patient-reported         6/3/2025     7:25 PM 5/7/2025     8:00 AM   AISHA-7 SCREENING   Feeling nervous, anxious, or on edge Not at all More than half the days   Not being able to stop or control worrying Not at all More than half the days   Worrying too much about different things Not at all More than half the days   Trouble relaxing Not at all More than half the days   Being so restless that it is hard to sit still Not at all Not at all   Becoming easily annoyed or irritable Not at all Several days   Feeling afraid as if something awful might happen Not at all Several days   AISHA-7 Total Score 0  10   How difficult have these problems made it for you to do your work, take care of things at home, or get along with other people? Not difficult at all        Patient-reported        AIMS Scale    N/A      Assessment and Plan:     Nena Colunga is a 29 y.o. White (non-) female with a history of anxiety and depression who presents to outpatient primary care psychiatry on 6/4/2025 for psychiatric medication management.    Assessment:   Patient with periodic sustained bouts of anxiety (occasionally followed by depression) ever since Feb 2024 when she returned home from an extended stay in [place]. Bouts of anxiety are debilitating and last anywhere from days to weeks in a row, before returning to her baseline euthymia. Anxiety is triggered by interpersonal conflict, and pt was diagnosed with PTSD / Developmental Trauma Disorder by her therapist (pt experienced recurrent conflict/yelling/slamming doors in household growing up).    Update: Patient is doing well on current med

## 2025-06-26 DIAGNOSIS — F32.A ANXIETY AND DEPRESSION: ICD-10-CM

## 2025-06-26 DIAGNOSIS — F41.9 ANXIETY AND DEPRESSION: ICD-10-CM

## 2025-06-26 RX ORDER — BUPROPION HYDROCHLORIDE 150 MG/1
150 TABLET ORAL EVERY MORNING
Qty: 90 TABLET | Refills: 1 | Status: SHIPPED | OUTPATIENT
Start: 2025-06-26

## 2025-06-26 NOTE — TELEPHONE ENCOUNTER
Refill request for Wellbutrin medication.     Name of [facility]acy- [facility]      Last visit - 03/03/2025     Pending visit - 07/30/2025    Last refill -03/03/2025

## 2025-07-28 DIAGNOSIS — Z00.00 ROUTINE GENERAL MEDICAL EXAMINATION AT A HEALTH CARE FACILITY: ICD-10-CM

## 2025-07-28 LAB
CHOLEST SERPL-MCNC: 173 MG/DL (ref 0–199)
HDLC SERPL-MCNC: 83 MG/DL (ref 40–60)
LDLC SERPL CALC-MCNC: 76 MG/DL
TRIGL SERPL-MCNC: 70 MG/DL (ref 0–150)
VLDLC SERPL CALC-MCNC: 14 MG/DL

## 2025-07-30 ENCOUNTER — OFFICE VISIT (OUTPATIENT)
Dept: INTERNAL MEDICINE CLINIC | Age: 30
End: 2025-07-30
Payer: COMMERCIAL

## 2025-07-30 VITALS
WEIGHT: 134 LBS | SYSTOLIC BLOOD PRESSURE: 112 MMHG | BODY MASS INDEX: 22.3 KG/M2 | HEART RATE: 92 BPM | DIASTOLIC BLOOD PRESSURE: 78 MMHG | OXYGEN SATURATION: 100 %

## 2025-07-30 DIAGNOSIS — F41.9 ANXIETY AND DEPRESSION: ICD-10-CM

## 2025-07-30 DIAGNOSIS — F32.A ANXIETY AND DEPRESSION: ICD-10-CM

## 2025-07-30 PROCEDURE — 1036F TOBACCO NON-USER: CPT | Performed by: NURSE PRACTITIONER

## 2025-07-30 PROCEDURE — G8427 DOCREV CUR MEDS BY ELIG CLIN: HCPCS | Performed by: NURSE PRACTITIONER

## 2025-07-30 PROCEDURE — G8420 CALC BMI NORM PARAMETERS: HCPCS | Performed by: NURSE PRACTITIONER

## 2025-07-30 PROCEDURE — 99213 OFFICE O/P EST LOW 20 MIN: CPT | Performed by: NURSE PRACTITIONER

## 2025-07-30 RX ORDER — NORELGESTROMIN AND ETHINYL ESTRADIOL 150; 35 UG/D; UG/D
1 PATCH TRANSDERMAL WEEKLY
Qty: 12 PATCH | Refills: 1 | Status: SHIPPED | OUTPATIENT
Start: 2025-07-30

## 2025-07-30 RX ORDER — NORELGESTROMIN AND ETHINYL ESTRADIOL 150; 35 UG/D; UG/D
PATCH TRANSDERMAL
Qty: 3 PATCH | Status: CANCELLED | OUTPATIENT
Start: 2025-07-30

## 2025-07-30 RX ORDER — CITALOPRAM HYDROBROMIDE 20 MG/1
20 TABLET ORAL DAILY
Qty: 90 TABLET | Refills: 1 | Status: SHIPPED | OUTPATIENT
Start: 2025-07-30

## 2025-07-30 RX ORDER — BUSPIRONE HYDROCHLORIDE 5 MG/1
5 TABLET ORAL 2 TIMES DAILY
Qty: 120 TABLET | Refills: 5 | Status: SHIPPED | OUTPATIENT
Start: 2025-07-30

## 2025-07-30 RX ORDER — BUPROPION HYDROCHLORIDE 150 MG/1
150 TABLET ORAL EVERY MORNING
Qty: 90 TABLET | Refills: 1 | Status: SHIPPED | OUTPATIENT
Start: 2025-07-30

## 2025-07-30 NOTE — PROGRESS NOTES
Nena Randle Keanu  1995        Chief Complaint   Patient presents with    Medication Check     Welbutin celexa buspar       Assessment/Plan:     1. Anxiety and depression  Maintain medication. Doing well. Improved.   Maintain therapy and close family support.     - buPROPion (WELLBUTRIN XL) 150 MG extended release tablet; Take 1 tablet by mouth every morning  Dispense: 90 tablet; Refill: 1  - busPIRone (BUSPAR) 5 MG tablet; Take 1 tablet by mouth 2 times daily  Dispense: 120 tablet; Refill: 5      Return in about 1 year (around 7/30/2026) for Physical, IO.      HPI:      Moving to [place]. Rental property starts Oct 1st. Living in camper that she owns 1st month.   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     Buspar 1 QHS, Wellbutrin XL 150mg daily (move to morning), Celexa 20mg daily.    Feels very stable and is not fearful of moving to [place]. She does feel some higher stress with getting rid of all of her stuff. Timeline is tight.     Continues to have good family support.     /78 (BP Site: Left Upper Arm, Patient Position: Sitting)   Pulse 92   Wt 60.8 kg (134 lb)   SpO2 100%   BMI 22.30 kg/m²     Prior to Visit Medications    Medication Sig Taking? Authorizing Provider   buPROPion (WELLBUTRIN XL) 150 MG extended release tablet Take 1 tablet by mouth every morning Yes Missael Samson APRN - CNP   busPIRone (BUSPAR) 5 MG tablet Take 1 tablet by mouth 2 times daily Yes Missael Samson APRN - CNP   citalopram (CELEXA) 20 MG tablet Take 1 tablet by mouth daily Yes Missael Samson APRN - CNP   XULANE 150-35 MCG/24HR Place 1 patch onto the skin once a week Yes Missael Samson APRN - CNP     Family History   Problem Relation Age of Onset    Diabetes Paternal Grandmother      Social History     Socioeconomic History    Marital status: Single     Spouse name: Not on file    Number of children: Not on file    Years of education: Not on file    Highest education level: Not on file   Occupational History